# Patient Record
Sex: MALE | Race: WHITE | NOT HISPANIC OR LATINO | Employment: UNEMPLOYED | URBAN - METROPOLITAN AREA
[De-identification: names, ages, dates, MRNs, and addresses within clinical notes are randomized per-mention and may not be internally consistent; named-entity substitution may affect disease eponyms.]

---

## 2017-05-18 ENCOUNTER — COMMUNICATION - HEALTHEAST (OUTPATIENT)
Dept: PEDIATRICS | Facility: CLINIC | Age: 22
End: 2017-05-18

## 2017-05-18 DIAGNOSIS — Z91.09 ENVIRONMENTAL ALLERGIES: ICD-10-CM

## 2017-06-12 ENCOUNTER — OFFICE VISIT - HEALTHEAST (OUTPATIENT)
Dept: PEDIATRICS | Facility: CLINIC | Age: 22
End: 2017-06-12

## 2017-06-12 DIAGNOSIS — Z00.00 ENCOUNTER FOR ANNUAL PHYSICAL EXAM: ICD-10-CM

## 2017-06-12 DIAGNOSIS — L70.0 ACNE VULGARIS: ICD-10-CM

## 2017-06-12 DIAGNOSIS — F84.0 ACTIVE AUTISTIC DISORDER: ICD-10-CM

## 2017-06-12 ASSESSMENT — MIFFLIN-ST. JEOR: SCORE: 1705.56

## 2017-06-13 ENCOUNTER — AMBULATORY - HEALTHEAST (OUTPATIENT)
Dept: LAB | Facility: CLINIC | Age: 22
End: 2017-06-13

## 2017-06-13 DIAGNOSIS — Z00.00 ENCOUNTER FOR ANNUAL PHYSICAL EXAM: ICD-10-CM

## 2017-06-13 LAB
CHOLEST SERPL-MCNC: 150 MG/DL
FASTING STATUS PATIENT QL REPORTED: YES
HDLC SERPL-MCNC: 41 MG/DL
LDLC SERPL CALC-MCNC: 88 MG/DL
TRIGL SERPL-MCNC: 105 MG/DL

## 2017-06-15 ENCOUNTER — COMMUNICATION - HEALTHEAST (OUTPATIENT)
Dept: PEDIATRICS | Facility: CLINIC | Age: 22
End: 2017-06-15

## 2017-06-23 ENCOUNTER — RECORDS - HEALTHEAST (OUTPATIENT)
Dept: ADMINISTRATIVE | Facility: OTHER | Age: 22
End: 2017-06-23

## 2017-06-26 ENCOUNTER — COMMUNICATION - HEALTHEAST (OUTPATIENT)
Dept: PEDIATRICS | Facility: CLINIC | Age: 22
End: 2017-06-26

## 2017-08-23 ENCOUNTER — COMMUNICATION - HEALTHEAST (OUTPATIENT)
Dept: PEDIATRICS | Facility: CLINIC | Age: 22
End: 2017-08-23

## 2017-08-23 DIAGNOSIS — Z91.09 ENVIRONMENTAL ALLERGIES: ICD-10-CM

## 2017-09-19 ENCOUNTER — RECORDS - HEALTHEAST (OUTPATIENT)
Dept: ADMINISTRATIVE | Facility: OTHER | Age: 22
End: 2017-09-19

## 2017-09-21 ENCOUNTER — COMMUNICATION - HEALTHEAST (OUTPATIENT)
Dept: PEDIATRICS | Facility: CLINIC | Age: 22
End: 2017-09-21

## 2018-01-19 ENCOUNTER — COMMUNICATION - HEALTHEAST (OUTPATIENT)
Dept: PEDIATRICS | Facility: CLINIC | Age: 23
End: 2018-01-19

## 2018-05-16 ENCOUNTER — COMMUNICATION - HEALTHEAST (OUTPATIENT)
Dept: PEDIATRICS | Facility: CLINIC | Age: 23
End: 2018-05-16

## 2018-05-16 DIAGNOSIS — Z91.09 ENVIRONMENTAL ALLERGIES: ICD-10-CM

## 2018-06-15 ENCOUNTER — OFFICE VISIT - HEALTHEAST (OUTPATIENT)
Dept: PEDIATRICS | Facility: CLINIC | Age: 23
End: 2018-06-15

## 2018-06-15 DIAGNOSIS — F84.0 ACTIVE AUTISTIC DISORDER: ICD-10-CM

## 2018-06-15 DIAGNOSIS — Z91.09 ENVIRONMENTAL ALLERGIES: ICD-10-CM

## 2018-06-15 DIAGNOSIS — Z00.00 ENCOUNTER FOR ANNUAL HEALTH EXAMINATION: ICD-10-CM

## 2018-06-15 DIAGNOSIS — F80.9 PROBLEMS WITH COMMUNICATION (INCLUDING SPEECH): ICD-10-CM

## 2018-06-15 ASSESSMENT — MIFFLIN-ST. JEOR: SCORE: 1763.74

## 2018-06-18 ENCOUNTER — AMBULATORY - HEALTHEAST (OUTPATIENT)
Dept: LAB | Facility: CLINIC | Age: 23
End: 2018-06-18

## 2018-06-18 DIAGNOSIS — Z00.00 ENCOUNTER FOR ANNUAL HEALTH EXAMINATION: ICD-10-CM

## 2018-06-18 LAB
ALBUMIN SERPL-MCNC: 4.5 G/DL (ref 3.5–5)
ALP SERPL-CCNC: 154 U/L (ref 45–120)
ALT SERPL W P-5'-P-CCNC: 31 U/L (ref 0–45)
AST SERPL W P-5'-P-CCNC: 25 U/L (ref 0–40)
BASOPHILS # BLD AUTO: 0 THOU/UL (ref 0–0.2)
BASOPHILS NFR BLD AUTO: 1 % (ref 0–2)
BILIRUB DIRECT SERPL-MCNC: 0.2 MG/DL
BILIRUB SERPL-MCNC: 0.5 MG/DL (ref 0–1)
CHOLEST SERPL-MCNC: 161 MG/DL
EOSINOPHIL # BLD AUTO: 0.2 THOU/UL (ref 0–0.4)
EOSINOPHIL NFR BLD AUTO: 2 % (ref 0–6)
ERYTHROCYTE [DISTWIDTH] IN BLOOD BY AUTOMATED COUNT: 11.4 % (ref 11–14.5)
FASTING STATUS PATIENT QL REPORTED: YES
HBA1C MFR BLD: 5.2 % (ref 3.5–6.1)
HCT VFR BLD AUTO: 47.9 % (ref 40–54)
HDLC SERPL-MCNC: 44 MG/DL
HGB BLD-MCNC: 16.3 G/DL (ref 14–18)
LDLC SERPL CALC-MCNC: 102 MG/DL
LYMPHOCYTES # BLD AUTO: 1.9 THOU/UL (ref 0.8–4.4)
LYMPHOCYTES NFR BLD AUTO: 27 % (ref 20–40)
MCH RBC QN AUTO: 29.1 PG (ref 27–34)
MCHC RBC AUTO-ENTMCNC: 34 G/DL (ref 32–36)
MCV RBC AUTO: 86 FL (ref 80–100)
MONOCYTES # BLD AUTO: 0.5 THOU/UL (ref 0–0.9)
MONOCYTES NFR BLD AUTO: 7 % (ref 2–10)
NEUTROPHILS # BLD AUTO: 4.5 THOU/UL (ref 2–7.7)
NEUTROPHILS NFR BLD AUTO: 64 % (ref 50–70)
PLATELET # BLD AUTO: 246 THOU/UL (ref 140–440)
PMV BLD AUTO: 7.4 FL (ref 7–10)
PROT SERPL-MCNC: 7.7 G/DL (ref 6–8)
RBC # BLD AUTO: 5.59 MILL/UL (ref 4.4–6.2)
TRIGL SERPL-MCNC: 76 MG/DL
WBC: 7 THOU/UL (ref 4–11)

## 2018-06-19 LAB
25(OH)D3 SERPL-MCNC: 45.4 NG/ML (ref 30–80)
25(OH)D3 SERPL-MCNC: 45.4 NG/ML (ref 30–80)

## 2018-08-13 ENCOUNTER — COMMUNICATION - HEALTHEAST (OUTPATIENT)
Dept: PEDIATRICS | Facility: CLINIC | Age: 23
End: 2018-08-13

## 2018-08-13 DIAGNOSIS — Z91.09 ENVIRONMENTAL ALLERGIES: ICD-10-CM

## 2019-04-15 ENCOUNTER — COMMUNICATION - HEALTHEAST (OUTPATIENT)
Dept: PEDIATRICS | Facility: CLINIC | Age: 24
End: 2019-04-15

## 2019-04-15 DIAGNOSIS — F41.9 ANXIETY: ICD-10-CM

## 2019-04-15 DIAGNOSIS — F84.0 ACTIVE AUTISTIC DISORDER: ICD-10-CM

## 2019-04-17 ENCOUNTER — COMMUNICATION - HEALTHEAST (OUTPATIENT)
Dept: PEDIATRICS | Facility: CLINIC | Age: 24
End: 2019-04-17

## 2019-04-17 DIAGNOSIS — F41.9 ANXIETY: ICD-10-CM

## 2019-04-17 DIAGNOSIS — F84.0 ACTIVE AUTISTIC DISORDER: ICD-10-CM

## 2019-04-30 ENCOUNTER — COMMUNICATION - HEALTHEAST (OUTPATIENT)
Dept: PEDIATRICS | Facility: CLINIC | Age: 24
End: 2019-04-30

## 2019-05-16 ENCOUNTER — OFFICE VISIT - HEALTHEAST (OUTPATIENT)
Dept: PEDIATRICS | Facility: CLINIC | Age: 24
End: 2019-05-16

## 2019-05-16 ENCOUNTER — COMMUNICATION - HEALTHEAST (OUTPATIENT)
Dept: PEDIATRICS | Facility: CLINIC | Age: 24
End: 2019-05-16

## 2019-05-16 DIAGNOSIS — F41.9 ANXIETY: ICD-10-CM

## 2019-05-16 DIAGNOSIS — F80.9 PROBLEMS WITH COMMUNICATION (INCLUDING SPEECH): ICD-10-CM

## 2019-05-16 DIAGNOSIS — F84.0 ACTIVE AUTISTIC DISORDER: ICD-10-CM

## 2019-05-16 ASSESSMENT — MIFFLIN-ST. JEOR: SCORE: 1789.14

## 2019-06-18 ENCOUNTER — COMMUNICATION - HEALTHEAST (OUTPATIENT)
Dept: PEDIATRICS | Facility: CLINIC | Age: 24
End: 2019-06-18

## 2019-06-18 DIAGNOSIS — F84.0 ACTIVE AUTISTIC DISORDER: ICD-10-CM

## 2019-06-18 DIAGNOSIS — F41.9 ANXIETY: ICD-10-CM

## 2019-07-23 ENCOUNTER — COMMUNICATION - HEALTHEAST (OUTPATIENT)
Dept: PEDIATRICS | Facility: CLINIC | Age: 24
End: 2019-07-23

## 2019-07-23 DIAGNOSIS — H57.9 ALLERGIC EYE REACTION: ICD-10-CM

## 2019-08-05 ENCOUNTER — COMMUNICATION - HEALTHEAST (OUTPATIENT)
Dept: PEDIATRICS | Facility: CLINIC | Age: 24
End: 2019-08-05

## 2019-08-05 DIAGNOSIS — F41.9 ANXIETY: ICD-10-CM

## 2019-08-07 ENCOUNTER — COMMUNICATION - HEALTHEAST (OUTPATIENT)
Dept: PEDIATRICS | Facility: CLINIC | Age: 24
End: 2019-08-07

## 2019-08-07 DIAGNOSIS — F84.0 ACTIVE AUTISTIC DISORDER: ICD-10-CM

## 2019-08-15 ENCOUNTER — COMMUNICATION - HEALTHEAST (OUTPATIENT)
Dept: PEDIATRICS | Facility: CLINIC | Age: 24
End: 2019-08-15

## 2019-08-15 DIAGNOSIS — Z91.09 ENVIRONMENTAL ALLERGIES: ICD-10-CM

## 2019-11-30 ENCOUNTER — COMMUNICATION - HEALTHEAST (OUTPATIENT)
Dept: PEDIATRICS | Facility: CLINIC | Age: 24
End: 2019-11-30

## 2019-11-30 DIAGNOSIS — F84.0 ACTIVE AUTISTIC DISORDER: ICD-10-CM

## 2019-12-13 ENCOUNTER — DOCUMENTATION ONLY (OUTPATIENT)
Dept: OTHER | Facility: CLINIC | Age: 24
End: 2019-12-13

## 2019-12-13 ENCOUNTER — AMBULATORY - HEALTHEAST (OUTPATIENT)
Dept: OTHER | Facility: CLINIC | Age: 24
End: 2019-12-13

## 2019-12-29 ENCOUNTER — COMMUNICATION - HEALTHEAST (OUTPATIENT)
Dept: PEDIATRICS | Facility: CLINIC | Age: 24
End: 2019-12-29

## 2019-12-29 DIAGNOSIS — F84.0 ACTIVE AUTISTIC DISORDER: ICD-10-CM

## 2020-01-16 ENCOUNTER — OFFICE VISIT - HEALTHEAST (OUTPATIENT)
Dept: PEDIATRICS | Facility: CLINIC | Age: 25
End: 2020-01-16

## 2020-01-16 DIAGNOSIS — F84.0 ACTIVE AUTISTIC DISORDER: ICD-10-CM

## 2020-01-16 DIAGNOSIS — F80.9 PROBLEMS WITH COMMUNICATION (INCLUDING SPEECH): ICD-10-CM

## 2020-01-16 ASSESSMENT — MIFFLIN-ST. JEOR: SCORE: 1795.38

## 2020-03-27 ENCOUNTER — COMMUNICATION - HEALTHEAST (OUTPATIENT)
Dept: PEDIATRICS | Facility: CLINIC | Age: 25
End: 2020-03-27

## 2020-03-27 DIAGNOSIS — F84.0 ACTIVE AUTISTIC DISORDER: ICD-10-CM

## 2020-05-05 ENCOUNTER — COMMUNICATION - HEALTHEAST (OUTPATIENT)
Dept: PEDIATRICS | Facility: CLINIC | Age: 25
End: 2020-05-05

## 2020-05-05 DIAGNOSIS — F41.9 ANXIETY: ICD-10-CM

## 2020-05-05 RX ORDER — HYDROXYZINE PAMOATE 25 MG/1
CAPSULE ORAL
Qty: 60 CAPSULE | Refills: 3 | Status: SHIPPED | OUTPATIENT
Start: 2020-05-05 | End: 2021-11-05

## 2020-05-06 ENCOUNTER — COMMUNICATION - HEALTHEAST (OUTPATIENT)
Dept: PEDIATRICS | Facility: CLINIC | Age: 25
End: 2020-05-06

## 2020-05-06 DIAGNOSIS — F84.0 ACTIVE AUTISTIC DISORDER: ICD-10-CM

## 2020-08-21 ENCOUNTER — COMMUNICATION - HEALTHEAST (OUTPATIENT)
Dept: PEDIATRICS | Facility: CLINIC | Age: 25
End: 2020-08-21

## 2020-08-21 DIAGNOSIS — Z91.09 ENVIRONMENTAL ALLERGIES: ICD-10-CM

## 2020-08-22 RX ORDER — MONTELUKAST SODIUM 10 MG/1
TABLET ORAL
Qty: 90 TABLET | Refills: 3 | Status: SHIPPED | OUTPATIENT
Start: 2020-08-22 | End: 2021-11-05

## 2020-09-11 ENCOUNTER — COMMUNICATION - HEALTHEAST (OUTPATIENT)
Dept: PEDIATRICS | Facility: CLINIC | Age: 25
End: 2020-09-11

## 2020-09-14 ENCOUNTER — COMMUNICATION - HEALTHEAST (OUTPATIENT)
Dept: PEDIATRICS | Facility: CLINIC | Age: 25
End: 2020-09-14

## 2020-10-03 ENCOUNTER — COMMUNICATION - HEALTHEAST (OUTPATIENT)
Dept: PEDIATRICS | Facility: CLINIC | Age: 25
End: 2020-10-03

## 2020-10-03 DIAGNOSIS — F84.0 ACTIVE AUTISTIC DISORDER: ICD-10-CM

## 2020-10-03 DIAGNOSIS — H57.9 ALLERGIC EYE REACTION: ICD-10-CM

## 2020-10-05 RX ORDER — OLOPATADINE HYDROCHLORIDE 1 MG/ML
SOLUTION/ DROPS OPHTHALMIC
Qty: 5 ML | Refills: 6 | Status: SHIPPED | OUTPATIENT
Start: 2020-10-05 | End: 2021-10-11

## 2020-10-29 ENCOUNTER — OFFICE VISIT - HEALTHEAST (OUTPATIENT)
Dept: FAMILY MEDICINE | Facility: CLINIC | Age: 25
End: 2020-10-29

## 2020-10-29 ENCOUNTER — RECORDS - HEALTHEAST (OUTPATIENT)
Dept: ADMINISTRATIVE | Facility: OTHER | Age: 25
End: 2020-10-29

## 2020-10-29 DIAGNOSIS — F84.0 ACTIVE AUTISTIC DISORDER: ICD-10-CM

## 2020-10-29 DIAGNOSIS — Z76.89 ENCOUNTER TO ESTABLISH CARE: ICD-10-CM

## 2020-10-29 DIAGNOSIS — J30.1 NON-SEASONAL ALLERGIC RHINITIS DUE TO POLLEN: ICD-10-CM

## 2020-10-29 DIAGNOSIS — Z51.81 ENCOUNTER FOR THERAPEUTIC DRUG MONITORING: ICD-10-CM

## 2020-10-29 DIAGNOSIS — Z00.00 ROUTINE GENERAL MEDICAL EXAMINATION AT A HEALTH CARE FACILITY: ICD-10-CM

## 2020-10-29 DIAGNOSIS — F80.9 PROBLEMS WITH COMMUNICATION (INCLUDING SPEECH): ICD-10-CM

## 2020-10-29 LAB
ALBUMIN SERPL-MCNC: 4.7 G/DL (ref 3.5–5)
ALP SERPL-CCNC: 162 U/L (ref 45–120)
ALT SERPL W P-5'-P-CCNC: 30 U/L (ref 0–45)
ANION GAP SERPL CALCULATED.3IONS-SCNC: 11 MMOL/L (ref 5–18)
AST SERPL W P-5'-P-CCNC: 19 U/L (ref 0–40)
BILIRUB SERPL-MCNC: 0.4 MG/DL (ref 0–1)
BUN SERPL-MCNC: 17 MG/DL (ref 8–22)
CALCIUM SERPL-MCNC: 10.2 MG/DL (ref 8.5–10.5)
CHLORIDE BLD-SCNC: 104 MMOL/L (ref 98–107)
CHOLEST SERPL-MCNC: 138 MG/DL
CO2 SERPL-SCNC: 26 MMOL/L (ref 22–31)
CREAT SERPL-MCNC: 0.83 MG/DL (ref 0.7–1.3)
ERYTHROCYTE [DISTWIDTH] IN BLOOD BY AUTOMATED COUNT: 11.5 % (ref 11–14.5)
FASTING STATUS PATIENT QL REPORTED: ABNORMAL
GFR SERPL CREATININE-BSD FRML MDRD: >60 ML/MIN/1.73M2
GLUCOSE BLD-MCNC: 86 MG/DL (ref 70–125)
HBA1C MFR BLD: 5 %
HCT VFR BLD AUTO: 46.9 % (ref 40–54)
HDLC SERPL-MCNC: 42 MG/DL
HGB BLD-MCNC: 15.9 G/DL (ref 14–18)
LDLC SERPL CALC-MCNC: 56 MG/DL
MCH RBC QN AUTO: 29.4 PG (ref 27–34)
MCHC RBC AUTO-ENTMCNC: 34 G/DL (ref 32–36)
MCV RBC AUTO: 87 FL (ref 80–100)
PLATELET # BLD AUTO: 243 THOU/UL (ref 140–440)
PMV BLD AUTO: 7.4 FL (ref 7–10)
POTASSIUM BLD-SCNC: 4.1 MMOL/L (ref 3.5–5)
PROT SERPL-MCNC: 7.6 G/DL (ref 6–8)
RBC # BLD AUTO: 5.42 MILL/UL (ref 4.4–6.2)
SODIUM SERPL-SCNC: 141 MMOL/L (ref 136–145)
TRIGL SERPL-MCNC: 201 MG/DL
WBC: 7.3 THOU/UL (ref 4–11)

## 2020-10-29 RX ORDER — CHLORAL HYDRATE 500 MG
2 CAPSULE ORAL DAILY
Status: SHIPPED | COMMUNITY
Start: 2020-10-29

## 2020-10-29 ASSESSMENT — MIFFLIN-ST. JEOR: SCORE: 1767.94

## 2020-10-30 LAB
25(OH)D3 SERPL-MCNC: 36.5 NG/ML (ref 30–80)
25(OH)D3 SERPL-MCNC: 36.5 NG/ML (ref 30–80)

## 2020-12-01 ENCOUNTER — COMMUNICATION - HEALTHEAST (OUTPATIENT)
Dept: FAMILY MEDICINE | Facility: CLINIC | Age: 25
End: 2020-12-01

## 2020-12-01 DIAGNOSIS — F84.0 ACTIVE AUTISTIC DISORDER: ICD-10-CM

## 2020-12-01 RX ORDER — RISPERIDONE 1 MG/1
TABLET ORAL
Qty: 110 TABLET | Refills: 1 | Status: SHIPPED | OUTPATIENT
Start: 2020-12-01 | End: 2021-07-19

## 2021-05-28 NOTE — PATIENT INSTRUCTIONS - HE
Recommend starting an increased dose of Fluoxetine from 10 mg to 15 mg and then to 20mg Start the new dose for a couple of week before the [place] trip.    Recommend using the Spotlight alarm clock to help make sure the patient stays in his room.

## 2021-05-28 NOTE — PROGRESS NOTES
ASSESSMENT:  1. Anxiety    2. Autistic Disorder    3. Learning Disability - Expressive Language    Mike has had improved symptoms of decreased anxiety- he has less rigidity of him needing to do certain chores at home or at his respite home, but he still has escalation of behaviors due to changes in typical routine that is making it hard for respite family to consistently provide care for him when they have other clients present.  At home, his symptoms are disruptive, but less so, as he is the only child living at home at this point, and parents are able to redirect him well. Despite this, mother notes she feels that there room for improvement in his symptoms presently.    PLAN:  AT this time will start with gradual increase to get Mike to fluoxetine 20 mg once daily. First, start with 15 mg for several days, then increase to 20 mg.  This will allow him to be at goal dosing a few weeks prior to parents having extended travel in [place] and Mike being in respite care.    Patient Instructions   Recommend starting an increased dose of Fluoxetine from 10 mg to 15 mg and then to 20mg Start the new dose for a couple of week before the [place] trip.    Recommend using the Spotlight alarm clock to help make sure the patient stays in his room.      No orders of the defined types were placed in this encounter.    There are no discontinued medications.    Return in about 3 months (around 8/16/2019) for Annual physical.    CHIEF COMPLAINT:  Chief Complaint   Patient presents with     Follow-up     Medication       HISTORY OF PRESENT ILLNESS:  Krishna is a 23 y.o. male presenting to the clinic today with his mom for evaluation of anxiety medication management. The mom reports when the patient cannot empty the , the dad could not come home last night, when the mom drives the dad's car, and when pt loses things the patient becomes upset.  He has been increasing fixated on these types of events.  Pt's mom states the  "patient would runaway when he becomes upset. The mom tries to calm down the patient and remind him other events they have planned that bring him clarisse.  Mike's symptoms were significantly disuruptive to his daily life and mom reached out to me via SYLOBt several weeks ago.  We initiated low dose fluoxetine, first 5 mg daily for several days then increased to 10 mg daily. The mom noticed a positive shift while in the medication and there is less \"worry talk.\" than previously. He still has some rigidity in thinking, which is disruptive at his respite care home.  Mike wants to be the only client to do chores. This makes it difficult for other clients, or Mike has escalation of \"worry talk\" and frustration behaviors when he does not get to do all the chores he wants to do.  These are examples how Mike's anxious symptoms are demonstrated.    The parents will go to [facility] on 6/4/2019 for 19 days as part of a medical mission trip.    . Pt has a job at [employer] and will take a break during the summer.  He will return to work in the fall and they are happy to have him there.    REVIEW OF SYSTEMS:   Review of Systems   Psychiatric/Behavioral: Positive for behavioral problems (becomes upset and runaway).        Positive for depression (improved).   All other systems reviewed and are negative.    PFSH:    Past Medical History:   Diagnosis Date     Autistic Disorder     Created by [employer] [facility] Annotation: Aug  3 2010  5:11PM - Charlotte Sepulveda: Dx at age 3.   Has done chelation therapy, DAN program.  Multiple behavior therapy programs.      Learning Disability - Expressive Language     Created by [employer] [facility] Annotation: Aug  3 2010  5:11PM - Nannette Eduardo: Uses Ipad to help  with communication.      Mental retardation      Paronychia     Created by [employer]        Family History   Problem Relation Age of Onset     Hypertension Mother      No Medical Problems Father      OCD Sister      Migraines " "Sister        Past Surgical History:   Procedure Laterality Date     Dental Repair      Age 6       Social History     Social History Narrative    Mom, dad, sister       VITALS:  Vitals:    05/16/19 1318   BP: 110/62   Patient Site: Left Arm   Patient Position: Sitting   Cuff Size: Adult Regular   Pulse: 82   Temp: 97.6  F (36.4  C)   TempSrc: Oral   Weight: 171 lb 4.8 oz (77.7 kg)   Height: 5' 11\" (1.803 m)     Wt Readings from Last 3 Encounters:   05/16/19 171 lb 4.8 oz (77.7 kg)   06/15/18 165 lb 11.2 oz (75.2 kg)   06/12/17 155 lb 8 oz (70.5 kg)     Body mass index is 23.89 kg/m .    PHYSICAL EXAM:  Alert, no acute distress.  Mood and affect are upbeat  There is occasional  eye contact with the examiner.  Patient appears relaxed and well groomed.  He repeats phrases spoken to him.  He demonstrates some insight and follows conversation intermittently Speech is unpressured.  Neck is without thyromegaly.  Cardiac exam regular rate and rhythm, normal S1 and S2.      ADDITIONAL HISTORY SUMMARIZED (2): None.  DECISION TO OBTAIN EXTRA INFORMATION (1): None.   RADIOLOGY TESTS (1): None.  LABS (1): None.  MEDICINE TESTS (1): None.  INDEPENDENT REVIEW (2 each): None.         The visit lasted a total of 26 minutes that I spent face to face with the patient. Over 50% of the time was spent counseling and educating the patient about depression medication management.    By signing my name below, I, Rivka Major, attest that this documentation has been prepared under the direction and in the presence of Dr. Charlotte Sepulveda.  Electronic Signature: Melissa Barcenas. 05/16/2019 1:37 PM.    I, Dr. Charlotte Sepulveda , personally performed the services described in this documentation. All medical record entries made by the scribe were at my direction and in my presence. I have reviewed the chart and discharge instructions (if applicable) and agree that the record reflects my personal performance and is accurate and " complete.    MEDICATIONS:  Current Outpatient Medications   Medication Sig Dispense Refill     cholecalciferol, vitamin D3, (VITAMIN D3 ORAL) Take by mouth.       FLUoxetine (PROZAC) 10 MG tablet TAKE ONE-HALF TABLET BY MOUTH ONCE DAILY FOR ONE WEEK, THEN INCREASE TO 1 FULL TABLET ONCE DAILY 90 tablet 0     fluticasone propionate (FLONASE) 50 mcg/actuation nasal spray Apply 1 spray into each nostril daily.       montelukast (SINGULAIR) 10 mg tablet TAKE 1 TABLET(10 MG) BY MOUTH BEDTIME 90 tablet 3     olopatadine (PATANOL) 0.1 % ophthalmic solution Administer 1 drop to both eyes 2 (two) times a day. 5 mL 6     risperiDONE (RISPERDAL) 1 MG tablet Take 0.5 to 1 mg by mouth in morning and 1 mg in pm. 180 tablet 3     UNABLE TO FIND Med Name:INDIRA Vitamin supplement       UNABLE TO FIND Med Name: Dimethylglycine with folic acid       montelukast (SINGULAIR) 10 mg tablet TAKE 1 TABLET(10 MG) BY MOUTH BEDTIME 90 tablet 2     No current facility-administered medications for this visit.        Total data points: 1

## 2021-05-28 NOTE — TELEPHONE ENCOUNTER
RN cannot approve Refill Request    RN can NOT refill this medication med is not covered by policy/route to provider. Last office visit: 5/16/2019 Charlotte Sepulveda MD Last Physical: 6/15/2018 Last MTM visit: Visit date not found Last visit same specialty: 5/16/2019 Charlotte Sepulveda MD.  Next visit within 3 mo: Visit date not found  Next physical within 3 mo: Visit date not found      Marilynnbalta Lei, Care Connection Triage/Med Refill 5/16/2019    Requested Prescriptions   Pending Prescriptions Disp Refills     FLUoxetine (PROZAC) 20 MG tablet [Pharmacy Med Name: FLUOXETINE 20MG TABLETS] 90 tablet 6     Sig: TAKE 1 TABLET(20 MG) BY MOUTH DAILY       SSRI Refill Protocol  Failed - 5/16/2019  2:33 PM        Failed - Age 21 and younger route to prescribing provider     Last office visit with prescriber/PCP: 5/16/2019 Charlotte Sepulveda MD OR same dept: 5/16/2019 Charlotte Sepulveda MD OR same specialty: 5/16/2019 Charlotte Sepulveda MD  Last physical: 6/15/2018 Last MTM visit: Visit date not found   Next visit within 3 mo: Visit date not found  Next physical within 3 mo: Visit date not found  Prescriber OR PCP: Charlotte Sepulveda MD  Last diagnosis associated with med order: 1. Anxiety  - FLUoxetine (PROZAC) 20 MG tablet [Pharmacy Med Name: FLUOXETINE 20MG TABLETS]; TAKE 1 TABLET(20 MG) BY MOUTH DAILY  Dispense: 90 tablet; Refill: 6    If protocol passes may refill for 12 months if within 3 months of last provider visit (or a total of 15 months).             Passed - PCP or prescribing provider visit in last year     Last office visit with prescriber/PCP: 5/16/2019 Charlotte Sepulveda MD OR same dept: 5/16/2019 Charlotte Sepulveda MD OR same specialty: 5/16/2019 Charlotte Sepulveda MD  Last physical: 6/15/2018 Last MTM visit: Visit date not found   Next visit within 3 mo: Visit date not found  Next physical within 3 mo: Visit date not found  Prescriber OR PCP: Charlotte  Roosevelt Sepulveda MD  Last diagnosis associated with med order: 1. Anxiety  - FLUoxetine (PROZAC) 20 MG tablet [Pharmacy Med Name: FLUOXETINE 20MG TABLETS]; TAKE 1 TABLET(20 MG) BY MOUTH DAILY  Dispense: 90 tablet; Refill: 6    If protocol passes may refill for 12 months if within 3 months of last provider visit (or a total of 15 months).

## 2021-05-29 ENCOUNTER — HEALTH MAINTENANCE LETTER (OUTPATIENT)
Age: 26
End: 2021-05-29

## 2021-05-29 ENCOUNTER — RECORDS - HEALTHEAST (OUTPATIENT)
Dept: ADMINISTRATIVE | Facility: CLINIC | Age: 26
End: 2021-05-29

## 2021-05-30 NOTE — TELEPHONE ENCOUNTER
Refill Approved    Rx renewed per Medication Renewal Policy. Medication was last renewed on 6/15/2018 with 6 refills.  Last office visit: 5/16/2019 with Dr CARMEN Sepulveda.    Marilynn Lei, Care Connection Triage/Med Refill 7/24/2019     Requested Prescriptions   Pending Prescriptions Disp Refills     olopatadine (PATANOL) 0.1 % ophthalmic solution [Pharmacy Med Name: OLOPATADINE 0.1% OPTH SOLN 5ML] 5 mL 0     Sig: INSTILL 1 DROP IN BOTH EYES TWICE DAILY       Opthalmic Allergy Drops Refill Protocol Passed - 7/23/2019 12:04 PM        Passed - Patient has had office visit/physical in last 12 months     Last office visit with prescriber/PCP: 5/16/2019 Charlotte Sepulveda MD OR same dept: 5/16/2019 Charlotte Sepulveda MD OR same specialty: 5/16/2019 Charlotte Sepulveda MD  Last physical: 6/15/2018 Last MTM visit: Visit date not found   Next visit within 3 mo: Visit date not found  Next physical within 3 mo: Visit date not found  Prescriber OR PCP: Charlotte Sepulveda MD  Last diagnosis associated with med order: There are no diagnoses linked to this encounter.  If protocol passes may refill for 12 months if within 3 months of last provider visit (or a total of 15 months).

## 2021-05-31 VITALS — BODY MASS INDEX: 22.26 KG/M2 | HEIGHT: 70 IN | WEIGHT: 155.5 LBS

## 2021-05-31 NOTE — TELEPHONE ENCOUNTER
RN cannot approve Refill Request    RN can NOT refill this medication med is not covered by policy/route to provider. Last office visit: 5/16/2019 Charlotte Sepulveda MD Last Physical: 6/15/2018 Last MTM visit: Visit date not found Last visit same specialty: 5/16/2019 Charlotte Sepulveda MD.  Next visit within 3 mo: Visit date not found  Next physical within 3 mo: Visit date not found      Lucy Reyes, Care Connection Triage/Med Refill 8/15/2019    Requested Prescriptions   Pending Prescriptions Disp Refills     montelukast (SINGULAIR) 10 mg tablet [Pharmacy Med Name: MONTELUKAST 10MG TABLETS] 90 tablet 0     Sig: TAKE 1 TABLET(10 MG) BY MOUTH BEDTIME       There is no refill protocol information for this order

## 2021-05-31 NOTE — TELEPHONE ENCOUNTER
RN cannot approve Refill Request    RN can NOT refill this medication med is not covered by policy/route to provider. Last office visit: 5/16/2019 Charlotte Sepulveda MD Last Physical: 6/15/2018 Last MTM visit: Visit date not found Last visit same specialty: 5/16/2019 Charlotte Sepulveda MD.  Next visit within 3 mo: Visit date not found  Next physical within 3 mo: Visit date not found      Madelyn Vazquez, Care Connection Triage/Med Refill 8/7/2019    Requested Prescriptions   Pending Prescriptions Disp Refills     risperiDONE (RISPERDAL) 1 MG tablet [Pharmacy Med Name: RISPERIDONE 1MG TABLETS] 180 tablet 0     Sig: TAKE 1/2- 1 TABLET BY MOUTH IN THE MORNING AND 1 TABLET IN THE EVENINGQ       There is no refill protocol information for this order

## 2021-05-31 NOTE — TELEPHONE ENCOUNTER
Fax received from [facility] pharmacy requesting Prior Authorization    Medication Name:   FLUoxetine (PROZAC) 20 MG tablet 90 tablet 1 5/17/2019     Sig: TAKE 1 TABLET(20 MG) BY MOUTH DAILY    Sent to pharmacy as: FLUoxetine (PROZAC) 20 MG tablet    Notes to Pharmacy: **Patient requests 90 days supply**    E-Prescribing Status: Receipt confirmed by pharmacy (5/17/2019  8:28 AM CDT)          Insurance Plan: [email or web address]   ELGIN:    Patient ID Number: [id number]    Please start PA process

## 2021-05-31 NOTE — TELEPHONE ENCOUNTER
Called pharmacy to clarify if they actually need a PA for 90 days supply, this should be going through insurance without an issue.     Per tech she stated they don't have a Rx for a 90 days supply.  Told her there is on Epic that looks like it was sent but she still stated she does not have a Rx for a 90 days supply.     She was unable to tell me then if a PA is needed because they never were able to run it through insurance, this was a refill request.      Please send Rx for 90 days supply again to [id number] - [place], [place] - [facility] [address] AT [facility] [address] [facility]

## 2021-06-01 VITALS — WEIGHT: 165.7 LBS | HEIGHT: 71 IN | BODY MASS INDEX: 23.2 KG/M2

## 2021-06-03 VITALS — BODY MASS INDEX: 23.98 KG/M2 | WEIGHT: 171.3 LBS | HEIGHT: 71 IN

## 2021-06-03 NOTE — TELEPHONE ENCOUNTER
This was refilled for 1 moth. Krishna is overdue for an appointment. I will leave this for Dr. Sepulveda later this week as I am not sure what she has discussed with them for transition of care.

## 2021-06-03 NOTE — TELEPHONE ENCOUNTER
RN cannot approve Refill Request    RN can NOT refill this medication med is not covered by policy/route to provider. Last office visit: 5/16/2019 Charlotte Sepulveda MD Last Physical: 6/15/2018 Last MTM visit: Visit date not found Last visit same specialty: 5/16/2019 Charlotte Sepulveda MD.  Next visit within 3 mo: Visit date not found  Next physical within 3 mo: Visit date not found      Marilynn Lei, Care [facility] Triage/Med Refill 11/30/2019    Requested Prescriptions   Pending Prescriptions Disp Refills     risperiDONE (RISPERDAL) 1 MG tablet [Pharmacy Med Name: RISPERIDONE 1MG TABLETS] 60 tablet 0     Sig: TAKE 1-2 TABLETS BY MOUTH ONCE DAILY AT BEDTIME       There is no refill protocol information for this order

## 2021-06-04 VITALS
HEIGHT: 70 IN | BODY MASS INDEX: 25.09 KG/M2 | WEIGHT: 175.3 LBS | DIASTOLIC BLOOD PRESSURE: 62 MMHG | SYSTOLIC BLOOD PRESSURE: 106 MMHG

## 2021-06-04 NOTE — TELEPHONE ENCOUNTER
Please ask parent to have Mike come in for a medication check appt.  His last visit was in May.  For stable patients without medication changes I have them come in every 6 months for a medication check to make sure we are still on appropriate dosing.  Please have Mike have an appt in January for medication check with me. Charlotte Sepulveda MD 12/5/2019 5:05 PM

## 2021-06-04 NOTE — TELEPHONE ENCOUNTER
RN cannot approve Refill Request    RN can NOT refill this medication med is not covered by policy/route to provider. Last office visit: Visit date not found Last Physical: Visit date not found Last MTM visit: Visit date not found Last visit same specialty: 5/16/2019 Charlotte Sepulveda MD.  Next visit within 3 mo: Visit date not found  Next physical within 3 mo: Visit date not found      Marilynn Lei, Care [facility] Triage/Med Refill 12/29/2019    Requested Prescriptions   Pending Prescriptions Disp Refills     risperiDONE (RISPERDAL) 1 MG tablet [Pharmacy Med Name: RISPERIDONE 1MG TABLETS] 60 tablet 0     Sig: TAKE 1 TO 2 TABLETS BY MOUTH EVERY DAY AT BEDTIME       There is no refill protocol information for this order

## 2021-06-05 VITALS
BODY MASS INDEX: 23.45 KG/M2 | HEART RATE: 96 BPM | WEIGHT: 167.5 LBS | DIASTOLIC BLOOD PRESSURE: 68 MMHG | SYSTOLIC BLOOD PRESSURE: 110 MMHG | HEIGHT: 71 IN

## 2021-06-05 NOTE — PATIENT INSTRUCTIONS - HE
Bing Walker at [facility]: [email or web address]  Phone: [phone]  Address: [facility] [address] Physicians that work with Medicinal Marijuana   Dr. Hayward   Address: [address] [place] [address]. [place] [address]  Phone: [phone]    Dr. Wild   Address: [address] [place] [address]. [place] [address]  Phone: [phone]

## 2021-06-05 NOTE — PROGRESS NOTES
ASSESSMENT:  1. Autistic Disorder    2. Learning Disability - Expressive Language    Mike has symptoms of rigid thinking and anxiety with his autism.  He was overactivated by fluoxetine when it was trialed last year.  At his annual exam we had discussed positive results for many patients with autism with initiating medicinal cannibis.  Mother is interested in pursuing at this time rather than trial another SSRI.     PLAN: Information given to mother of providers who have been certified in prescribing medicinal cannibis.  Mother will initially look at [facility] with Bing Walker DNP.  She will call if she has any further questions.   Also discussed for Mike to have 1 last annual physical with me and then transition to adult medicine care.     Patient Instructions   Bing Walker at [facility]: [email or web address]  Phone: [phone]  Address: [address] [place] [address] Physicians that work with Medicinal Marijuana   Dr. Hayward   Address: [address] [place] [address]. [place] [address]  Phone: [phone]    Dr. Wild   Address: [address] [place] [address]. [place] [address]  Phone: [phone]          No orders of the defined types were placed in this encounter.    Medications Discontinued During This Encounter   Medication Reason     risperiDONE (RISPERDAL) 1 MG tablet Duplicate order     montelukast (SINGULAIR) 10 mg tablet Duplicate order       No follow-ups on file.    CHIEF COMPLAINT:  Chief Complaint   Patient presents with     Medication Management       HISTORY OF PRESENT ILLNESS:  Krishna is a 24 y.o. male presenting to the clinic today for a medication check. He is accompanied by his mom.     He stopped taking the fluoxetine 8 months ago because he was unable to sleep, he was too focused on certain things, and he was not tolerating the increased dose from 5 to 10 mg. Mom is unsure about starting the fluoxetine again, she would be  interested in trying CBD/THC.     He still uses the risperidone twice a day, 1 mg in the morning and night. He picks his ears and pulls his hair when he is stressed out. He gets stressed when a new event is coming up, like respite care or group outings with [facility]. He also is chronically worrying about things at home when he is not at home. Mom gets very stressed out from constantly having to reassure him.     Health maintenance: He wakes up at 7 am and goes to bed at 9:30 pm. He eats oatmeal for breakfast. He likes cheese sticks with meat for lunch. He makes tacos for dinner, he helps make it with his parents. He likes to drink root beer and water. He carries a water bottle around with him to remind him to drink it everyday.     REVIEW OF SYSTEMS:   All other systems are negative.    PFSH:  His sister is living at home and working as a nurse at [employer]. His sister suffers from OCD and anxiety. She has been on fluoxetine in the past    They have begun looking for a place for Mike to live in the future. He will not live in a group home. They plan to have him live with roommates and have staff come to his house. This is a 2-3 year plan in the making. [facility] still goes to [facility] 4 days a month. He goes for 4 days in a row, he will be going there next week.     He goes to [facility] 3 days a week. They went to the [employer] today. They do outings for him with a group. Mom thinks it is the best option out there for him, but she wishes there was more of a structure. Mike likes a routine and a structure.       Past Medical History:   Diagnosis Date     Autistic Disorder     Created by [employer] [facility] Annotation: Aug  3 2010  5:11PM - Charlotte Sepulveda: Dx at age 3.   Has done chelation therapy, DAN program.  Multiple behavior therapy programs.      Learning Disability - Expressive Language     Created by [employer] [facility] Annotation: Aug  3 2010  5:11PM -  "Nannette Eduardo: Uses Ipad to help  with communication.      Mental retardation      Paronychia     Created by [employer]        Family History   Problem Relation Age of Onset     Hypertension Mother      No Medical Problems Father      OCD Sister      Migraines Sister        Past Surgical History:   Procedure Laterality Date     Dental Repair      Age 6         VITALS:  Vitals:    01/16/20 1445   BP: 106/62   Patient Site: Left Arm   Patient Position: Sitting   Cuff Size: Adult Regular   Weight: 175 lb 4.8 oz (79.5 kg)   Height: 5' 10.25\" (1.784 m)     Wt Readings from Last 3 Encounters:   01/16/20 175 lb 4.8 oz (79.5 kg)   05/16/19 171 lb 4.8 oz (77.7 kg)   06/15/18 165 lb 11.2 oz (75.2 kg)     Body mass index is 24.97 kg/m .    PHYSICAL EXAM:  Alert, no acute distress.  Mood is euthymic, affect is congruent.  There is limited eye contact with the examiner.  Patient appears relaxed and well groomed.  No psychomotor agitation or retardation. His speech patterns are repetative, and answers questions appropriately.   Lungs: Clear to auscultation bilaterally. No wheezes, rhonchi, or rales. No prolongation of expiratory phase.   Cardiac: Regular rate and rhythm, no murmur audible.    ADDITIONAL HISTORY SUMMARIZED (2): None.  DECISION TO OBTAIN EXTRA INFORMATION (1): None.   RADIOLOGY TESTS (1): None.  LABS (1): None.  MEDICINE TESTS (1): None.  INDEPENDENT REVIEW (2 each): None.     The visit lasted a total of 25 minutes that I spent face to face with the patient. Over 50% of the time was spent counseling and educating the patient about medication check.    I, Vanessa Bose, am scribing for and in the presence of, Dr. Sepulveda.    I, Dr. Ewelina Sepulveda, personally performed the services described in this documentation, as scribed by Vanessa Bose in my presence, and it is both accurate and complete.    MEDICATIONS:  Current Outpatient Medications   Medication Sig Dispense Refill     cholecalciferol, vitamin D3, (VITAMIN D3 " ORAL) Take by mouth.       fluticasone propionate (FLONASE) 50 mcg/actuation nasal spray Apply 1 spray into each nostril daily.       montelukast (SINGULAIR) 10 mg tablet TAKE 1 TABLET(10 MG) BY MOUTH BEDTIME 90 tablet 3     olopatadine (PATANOL) 0.1 % ophthalmic solution Administer 1 drop to both eyes 2 (two) times a day. 5 mL 6     risperiDONE (RISPERDAL) 1 MG tablet TAKE 1/2- 1 TABLET BY MOUTH IN THE MORNING AND 1 TABLET IN THE EVENINGQ 180 tablet 0     UNABLE TO FIND Med Name:INDIRA Vitamin supplement       UNABLE TO FIND Med Name: Dimethylglycine with folic acid       FLUoxetine (PROZAC) 10 MG tablet TAKE ONE-HALF TABLET BY MOUTH ONCE DAILY FOR ONE WEEK, THEN INCREASE TO 1 FULL TABLET ONCE DAILY 90 tablet 0     FLUoxetine (PROZAC) 20 MG tablet Take 1 tablet (20 mg total) by mouth daily. 90 tablet 1     No current facility-administered medications for this visit.        Total data points:0

## 2021-06-07 NOTE — TELEPHONE ENCOUNTER
RN cannot approve Refill Request    RN can NOT refill this medication med is not covered by policy/route to provider. Last office visit: Visit date not found Last Physical: Visit date not found Last MTM visit: Visit date not found Last visit same specialty: 1/16/2020 Charlotte Sepulveda MD.  Next visit within 3 mo: Visit date not found  Next physical within 3 mo: Visit date not found      Tali Corbin, [facility] Triage/Med Refill 3/27/2020    Requested Prescriptions   Pending Prescriptions Disp Refills     risperiDONE (RISPERDAL) 1 MG tablet [Pharmacy Med Name: RISPERIDONE 1MG TABLETS] 180 tablet 0     Sig: TAKE 1/2- 1 TABLET BY MOUTH IN THE MORNING AND 1 TABLET BY MOUTH INTHE EVENING       There is no refill protocol information for this order

## 2021-06-08 NOTE — TELEPHONE ENCOUNTER
RN cannot approve Refill Request    RN can NOT refill this medication med is not covered by policy/route to provider. Last office visit: Visit date not found Last Physical: Visit date not found Last MTM visit: Visit date not found Last visit same specialty: 1/16/2020 Charlotte Sepulveda MD.  Next visit within 3 mo: Visit date not found  Next physical within 3 mo: Visit date not found      Marilynn Lei, [facility] Triage/Med Refill 5/6/2020    Requested Prescriptions   Pending Prescriptions Disp Refills     risperiDONE (RISPERDAL) 1 MG tablet 180 tablet 0     Sig: TAKE 1/2- 1 TABLET BY MOUTH IN THE MORNING AND 1 TABLET BY MOUTH INTHE EVENING       There is no refill protocol information for this order

## 2021-06-10 NOTE — TELEPHONE ENCOUNTER
RN cannot approve Refill Request    RN can NOT refill this medication Protocol failed and NO refill given. Last office visit: 1/16/2020 Charlotte Sepulveda MD Last Physical: 6/15/2018 Last MTM visit: Visit date not found Last visit same specialty: 1/16/2020 Charlotte Sepulveda MD.  Next visit within 3 mo: Visit date not found  Next physical within 3 mo: Visit date not found      Tali Corbin, [facility] Triage/Med Refill 8/22/2020    Requested Prescriptions   Pending Prescriptions Disp Refills     montelukast (SINGULAIR) 10 mg tablet [Pharmacy Med Name: MONTELUKAST 10MG TABLETS] 90 tablet 3     Sig: TAKE 1 TABLET(10 MG) BY MOUTH BEDTIME       There is no refill protocol information for this order

## 2021-06-11 NOTE — TELEPHONE ENCOUNTER
Left message to call back for: [facility]   Information to relay to patient:  in regards to the physical form we received for Krishna. He is overdue for a px. He has not had one since 6/2018.  said that she would be happy to see them again or that she has previously recommended establishing care with Dr. Mcdonnell at the [facility] she is leaving the decision up to the family.     Please help schedule pt for a px with whomever they decide when they call back.     Deepika Beckwith, FARAZA

## 2021-06-11 NOTE — PROGRESS NOTES
[facility] Well Child Check    ASSESSMENT & PLAN  Krishna Morel is a 21 y.o. who has normal growth and abnormal development:  Autism Spectrum Disorder, Learning Disability- Expressive Language.    Diagnoses and all orders for this visit:    Encounter for annual physical exam  -     Lipid Profile; Future  -     Thyroid Stimulating Hormone (TSH); Future  -     Glucose; Future  -     Hepatic Profile; Future  -     HM1(CBC and Differential); Future    Autistic Disorder    Acne vulgaris    Other orders  -     risperiDONE (RISPERDAL) 1 MG tablet; Take 1-2 tablets (1-2 mg total) by mouth at bedtime.  Dispense: 60 tablet; Refill: 11  -     dapsone 5 % topical gel; Apply to affected area once daily for 3 months  Dispense: 30 g; Refill: 3  -     tretinoin (RETIN-A) 0.025 % cream; Apply topically at bedtime.  Dispense: 45 g; Refill: 3     I discuss that I will continue to refill risperidone for Mike.  His psychiatrist is no longer at previous clinic and will be at the [facility].  We discussed if he returns to having increased emotional difficulties with medicaiton management will refer back to psychiatry  I have discussed that I am happy to see Mike up until approx 24-25 years of age, and then will help transition to med/peds likely.  Dapzone and tretinoin given for help with acne vulgaris.  He does get cystic acne but mother has been expereienceing success with topical creams.   Advised on updating immunizaionts particularly Tdap and Menactra.    Return to clinic in 1 year for a Well Child Check or sooner as needed    IMMUNIZATIONS/LABS  No immunizations due today.    REFERRALS  Dental:  Recommend routine dental care as appropriate., The patient has already established care with a dentist.  Other:  No referrals were made at this time.    ANTICIPATORY GUIDANCE  I have reviewed age appropriate anticipatory guidance.  Social:  Social Activities  Parenting:  [place]/Dependence and Chores  Nutrition:  Age Specific Nutritional  Needs  Play and Communication:  Appropriate Use of TV and Hobbies  Health:  Activity (>45 min/day), Sleep and Dental Care  Safety:  Seat Belts, Swimming Safety and Bike/Motorcycle Helmets    HEALTH HISTORY  Do you have any concerns that you'd like to discuss today?: discuss acne meds- mom tried sisters with him and they seemed to work    He has severe acne on his face and back. He tends to develop large, deep cysts. His sister uses Aczone for her acne which works well. Mom tried using Aczone on his skin which has significantly improved his skin.    Roomed by: aleisha    Accompanied by Mother    Refills needed? No    Do you have any forms that need to be filled out? No      Do you have any significant health concerns in your family history?: No   Family History   Problem Relation Age of Onset     Hypertension Mother      No Medical Problems Father      OCD Sister      Migraines Sister       Since your last visit, have there been any major changes in your family, such as a move, job change, separation, divorce, or death in the family?: No    Home  Who lives in your home?:  Mom, dad, sister, Shabana, and dog  Social History     Social History Narrative    Mom, dad, sister     Do you have any trouble with sleep?:  No, he sleeps soundly through the night without waking. He gets sufficient restful sleep each night. He is well-rested and has a good energy level during the day.    Education  What school does your child attend?:  [employer]  What grade is your child in?:  n/a  How does the patient perform in school (grades, behavior, attention, homework?: n/a. He has one more year in the [employer]. 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 He will begin a daytime program after he finishes the [employer] next year. His parents have also been applying for financial assistance for him. They plan to transition him to a group home setting in the future.  (around 24-25 years of age)    Eating He has a good appetite. He likes chicken, asparagus, apples, lima beans, peas, and corn. He does not like salad. He likes to help his mother cook. He eats a healthy, balanced diet with a variety of fruits, vegetables, and proteins. He drinks water daily but does not drink milk. He is well-nourished and maintaining a healthy body weight.  Does patient eat regular meals including fruits and vegetables?:  yes  What is the patient drinking (cow's milk, water, soda, juice, sports drinks, energy drinks, etc)?: water and not a milk drinker  Does patient have concerns about body or appearance?:  No    Activities  Does the patient have friends?:  yes  Does the patient get at least one hour of physical activity per day?:  yes  Does the patient have less than 2 hours of screen time per day (aside from homework)?: He gets about 2 hours per day. He has his own iPad.  What does your child do for exercise?:  Special olympics- softball, bowling, walking, and swimming  Does the patient have interest/participate in community activities/volunteers/school sports?:  no    MENTAL HEALTH SCREENING  No Data Recorded    VISION/HEARING  Vision: Patient is already followed by a vision specialist  Hearing:  Completed. See Results    No exam data present    TB Risk Assessment:  The patient and/or parent/guardian answer positive to:  patient and/or parent/guardian answer 'no' to all screening TB questions    Dental  Is your child being seen by a dentist?  Yes    Patient Active Problem List   Diagnosis     Paronychia     Autistic Disorder     Learning Disability - Expressive Language     Drugs  Does the patient use tobacco/alcohol/drugs?:  no    Safety  Does the patient have any safety concerns (peer or home)?:  no  Does the patient use safety belts, helmets and other safety equipment?:  yes    Sex  Is the patient sexually active?:  no    REVIEW OF SYSTEMS  History obtained from mother and child.  General:  "Negative  Allergies: He has had significant seasonal allergy symptoms this spring. His eyes have been itchy. He uses eye drops but does not like to use them. He has not tried using a nasal steroid spray in the past. His asthma has been well-controlled with Singulair.  Dental: He brushes his teeth daily. He has needed a few sedated dental surgeries in the past.  Autism Spectrum Disorder: Mom notes he has been managing his behaviors well in terms of impulse control and appropriate actions. His behavior has been helped by daily risperidone use.  His parents have no other health or developmental concerns.    MEASUREMENTS  Height:  5' 10.25\" (1.784 m)  Weight: 155 lb 8 oz (70.5 kg)  BMI: Body mass index is 22.15 kg/(m^2).  Blood Pressure: 122/76  Growth percentile SmartLinks can only be used for patients less than 20 years old.    PHYSICAL EXAM  General: Awake, Alert and Cooperative.   Head: Normocephalic and Atraumatic.   Eyes: PERRL and EOMI.   ENT: Normal pearly TMs bilaterally and Oropharynx clear. Tonsils normal. Normal dentition.   Neck: Supple and Thyroid without enlargement or nodules. No lymphadenopathy.   Chest: Chest wall normal.   Lungs: Clear to auscultation bilaterally.   Heart: Regular rate and rhythm and no murmurs. Femoral pulses 2+ bilaterally.   Abdomen: Soft, nontender, nondistended, no mass palpable, and no hepatosplenomegaly.   : Normal external male genitalia and sexual maturity rating 5.   Spine: Spine straight without curvature noted.   Musculoskeletal: Moving all extremities and No pain in the extremities.   Neuro: Alert and oriented times 3, Normal tone in upper and lower extremities, Grossly normal and DTRs +2 bilaterally.   Skin: Inflamma Acne present on face and back. No cystic lesions seen today.     ADDITIONAL HISTORY SUMMARIZED (2): None.  DECISION TO OBTAIN EXTRA INFORMATION (1): None.   RADIOLOGY TESTS (1): None.  LABS (1): None.  MEDICINE TESTS (1): None.  INDEPENDENT REVIEW (2 " each): None.     The visit lasted a total of 30 minutes face to face with the patient. Over 50% of the time was spent counseling and educating the patient about his overall health and development.    I, Ed Huitron, am scribing for and in the presence of, Dr. Sepulveda.    I, Charlotte Sepulveda, personally performed the services described in this documentation, as scribed by Ed Huitron in my presence, and it is both accurate and complete.    Total Data Points: 0

## 2021-06-12 NOTE — TELEPHONE ENCOUNTER
RN cannot approve Refill Request    RN can NOT refill this medication med is not covered by policy/route to provider. Last office visit: 1/16/2020 Charlotte Sepulveda MD Last Physical: 6/15/2018 Last MTM visit: Visit date not found Last visit same specialty: 1/16/2020 Charlotte Sepulveda MD.  Next visit within 3 mo: Visit date not found  Next physical within 3 mo: Visit date not found      Tali Corbin, [facility] Triage/Med Refill 10/4/2020    Requested Prescriptions   Pending Prescriptions Disp Refills     risperiDONE (RISPERDAL) 1 MG tablet [Pharmacy Med Name: RISPERIDONE 1MG TABLETS] 180 tablet 0     Sig: TAKE 1 TABLET BY MOUTH IN THE MORNING AND 1 TABLET BY MOUTH IN THE EVENING       There is no refill protocol information for this order        olopatadine (PATANOL) 0.1 % ophthalmic solution [Pharmacy Med Name: OLOPATADINE 0.1% OPTH SOLN 5ML] 5 mL 6     Sig: INSTILL 1 DROP IN BOTH EYES TWICE DAILY       Opthalmic Allergy Drops Refill Protocol Passed - 10/3/2020 10:29 AM        Passed - Patient has had office visit/physical in last 12 months     Last office visit with prescriber/PCP: 1/16/2020 Cahrlotte Sepulveda MD OR same dept: 1/16/2020 Charlotte Sepulveda MD OR same specialty: 1/16/2020 Charlotte Sepulveda MD  Last physical: 6/15/2018 Last MTM visit: Visit date not found   Next visit within 3 mo: Visit date not found  Next physical within 3 mo: Visit date not found  Prescriber OR PCP: Charlotte Sepulveda MD  Last diagnosis associated with med order: 1. Autistic Disorder  - risperiDONE (RISPERDAL) 1 MG tablet [Pharmacy Med Name: RISPERIDONE 1MG TABLETS]; TAKE 1 TABLET BY MOUTH IN THE MORNING AND 1 TABLET BY MOUTH IN THE EVENING  Dispense: 180 tablet; Refill: 0    2. Allergic eye reaction  - olopatadine (PATANOL) 0.1 % ophthalmic solution [Pharmacy Med Name: OLOPATADINE 0.1% OPTH SOLN 5ML]; INSTILL 1 DROP IN BOTH EYES TWICE DAILY  Dispense: 5 mL; Refill: 6    If protocol  passes may refill for 12 months if within 3 months of last provider visit (or a total of 15 months).

## 2021-06-13 NOTE — TELEPHONE ENCOUNTER
Refill request on the Risperidone from Parmjit on [facility].  Last Physical with Dar on 10/29/20. Rupinder Pollard LPN

## 2021-06-18 NOTE — PROGRESS NOTES
" [facility] Well Child Check    ASSESSMENT & PLAN  Krishna Morel is a 22 y.o. who has normal growth and abnormal development:  Autism Spectrum Disorder and Expressive Speech Delay.    Diagnoses and all orders for this visit:    Encounter for annual health examination  -     Glycosylated Hemoglobin A1C; Future  -     HM1(CBC and Differential); Future  -     Vitamin D, Total (25-Hydroxy); Future  -     Hepatic Profile; Future  -     Lipid Profile; Future    Environmental allergies  -     montelukast (SINGULAIR) 10 mg tablet; TAKE 1 TABLET(10 MG) BY MOUTH BEDTIME  Dispense: 90 tablet; Refill: 2    Autistic Disorder    Learning Disability - Expressive Language    Other orders  -     Cancel: PHQ9 Depression Screen  -     risperiDONE (RISPERDAL) 1 MG tablet; Take 0.5 to 1 mg by mouth in morning and 1 mg in pm.  Dispense: 180 tablet; Refill: 3  -     olopatadine (PATANOL) 0.1 % ophthalmic solution; Administer 1 drop to both eyes 2 (two) times a day.  Dispense: 5 mL; Refill: 6    Routine blood work due to chronic use of rispiridone.  AT this time, he is taking 0.5-1mg in a.m. And 1 mg in pm.  He can perseverated and get \"obsessive\" behaviors over certain things at time. Currently family is working on behavior management but at in the future we will consider use of sertraline if needed. Mom to call me with update if desires to try sertraline in future.    Refill treatment for allergic rhinoconjunctivitis with montelukast and olopatadine.  Consider therapy at [facility] for behavioral management with obsessive behaviors, although in time may be good benefit with both medication and behavioral management.  I will continue to see Mike for the next 2-3 years before transition to adult medicine.    Return to clinic in 1 year for a Well Child Check or sooner as needed    IMMUNIZATIONS/LABS  No immunizations due today. and Labs ordered. Results pending.    REFERRALS  Dental:  Recommend routine dental care as appropriate., The patient " has already established care with a dentist.  Other:  No referrals were made at this time.    ANTICIPATORY GUIDANCE  I have reviewed age appropriate anticipatory guidance.  Social:  Friends and Changes and Choices  Parenting:  [place]/Dependence and Chores  Nutrition:  Age Specific Nutritional Needs  Play and Communication:  Hobbies and Read Books  Health:  Activity (>45 min/day), Sleep, Sun Screen and Dental Care  Safety:  Seat Belts, Swimming Safety, Bike/Motorcycle Helmets and Outdoor Safety Avoiding Sun Exposure  Sexuality:  Safe Sex    HEALTH HISTORY  Do you have any concerns that you'd like to discuss today?: changes to medications    Behavior: He typically takes one tablet of Risperdal in the morning and one tablet of it in the evening which has been a good regimen for him. There are times when he occasionally becomes irritated by random events so mom has found taking one tablet in the morning and at night has been most effective.    Allergies: He takes Singulair daily during peak allergy season as well as uses Flonase to manage his symptoms. He uses allergy relief eye drops as needed.    Acne: He no longer uses Retinol-A cream to manage his acne. He has not tried using adapalene cream.    Roomed by: danay    Accompanied by Mother    Refills needed? No    Do you have any forms that need to be filled out? No      Do you have any significant health concerns in your family history?: No  Family History   Problem Relation Age of Onset     Hypertension Mother      No Medical Problems Father      OCD Sister      Migraines Sister      Since your last visit, have there been any major changes in your family, such as a move, job change, separation, divorce, or death in the family?: No  Has a lack of transportation kept you from medical appointments?: No    Home  Who lives in your home?:  See narrative below.  Social History     Social History Narrative    Mom, dad, sister     Do you have any concerns about losing  your housing?: No  Is your housing safe and comfortable?: Yes    Do you have any trouble with sleep?:  No, he falls asleep quickly in the evening. He sleeps soundly at night. He gets sufficient sleep each night. He has a good daytime energy level.    Education  What school do you child attend?:  [employer]  How do you perform in school (grades, behavior, attention, homework?: Well. He has transitioned to a program at [employer] recently. He enjoys [employer] and learning there. He will be working on life skills and the program will assist him with job placement. He also lives in a group home a few times per week with three other peers. His parents are working on getting him set up with [employer] to help him get around. Mom notes he loves to do chores at home so his parents have been working with him on being less obsessive-compulsive about completing them.    Eating He has a good appetite. He loves to cook. He does not like salads but overall eats a healthy, balanced diet with a variety of fruits, vegetables, and proteins. He drinks water and juice mainly.  Do you eat regular meals including fruits and vegetables?:  yes  What are you drinking (cow's milk, water, soda, juice, sports drinks, energy drinks, etc)?: water and juice  Have you been worried that you don't have enough food?: No  Do you have concerns about your body or appearance?:  No    Activities  Do you have friends?:  Yes, he gets along well with his peers and has good friends with whom he enjoys spending time.  Do you get at least one hour of physical activity per day?:  yes  How many hours a day are you in front of a screen other than for schoolwork (computer, TV, phone)?:  3  What do you do for exercise?:  Swimming, walking   Do you have interest/participate in community activities/volunteers/school sports?:  yes    VISION/HEARING  Vision: Patient is already followed by a vision specialist  Hearing: Completed. See results.    No exam data  "present    TB Risk Assessment:  The patient and/or parent/guardian answer positive to:  patient and/or parent/guardian answer 'no' to all screening TB questions    Dyslipidemia Risk Screening  Have either of your parents or any of your grandparents had a stroke or heart attack before age 55?: No  Any parents with high cholesterol or currently taking medications to treat?: No     Dental  When was the last time you saw the dentist?: 0-3 months ago   Fluoride not applied today.  Last fluoride varnish application was within the past 3 months.      Patient Active Problem List   Diagnosis     Paronychia     Autistic Disorder     Learning Disability - Expressive Language     Drugs  Does the patient use tobacco/alcohol/drugs?:  No. In group respite home he can have 1 alcoholic beverage if he wants.  He prefers root beer.    Safety  Does the patient have any safety concerns (peer or home)?:  no  Does the patient use safety belts, helmets and other safety equipment?:  yes    Sex  Have you ever had sex?:  No    REVIEW OF SYSTEMS  History obtained from mother and child.  General: Negative  Dental: He brushes his teeth daily. He does not have dental caries. His dentist has recommended he have his wisdom teeth extracted.  His parents have no other health or developmental concerns.    MEASUREMENTS  Height:  5' 11\" (1.803 m)  Weight: 165 lb 11.2 oz (75.2 kg)  BMI: Body mass index is 23.11 kg/(m^2).  Blood Pressure: 120/80  Growth percentile SmartLinks can only be used for patients less than 20 years old.    PHYSICAL EXAM  Constitutional: He appears well-developed and well-nourished. He is awake, alert, and cooperative.  HEENT: Head: Normocephalic. Atraumatic.   Right Ear: Normal, pearly tympanic membrane; external ear and canal normal.    Left Ear: Normal, pearly tympanic membrane, external ear and canal normal.    Nose: Nose normal.    Mouth/Throat: Mucous membranes are moist. Oropharynx is clear. Tonsils +1 bilaterally. Normal " dentition.   Eyes: Conjunctivae and lids are normal. PERRL, EOMI.  Neck: Supple without lymphadenopathy or tenderness. No thyromegaly or nodules.   Cardiovascular: Normal rate and regular rhythm. No murmur heard. Femoral pulses 2+ bilaterally.  Pulmonary: Clear to auscultation bilaterally. Effort and breath sounds normal. There is normal air entry.  Chest: Normal chest wall.    Abdominal: Soft, nontender, nondistended. No hepatosplenomegaly. Bowel sounds are normal.   Genitourinary: Normal external male genitalia. Testes descended bilaterally. He is circumcised. SMR 5.   Musculoskeletal: Moving all extremities with normal range of motion. Normal strength and tone. No abnormalities. No pain in the extremities.  Spine: Spine straight without curvature noted.  Neurological: He is alert and oriented x3. He has normal reflexes. Normal tone and DTRs +2 bilaterally.  Psychiatric: He has a normal mood and affect. His speech and behavior are normal.  Skin: Clear. No rashes or lesions noted.    ADDITIONAL HISTORY SUMMARIZED (2): None.  DECISION TO OBTAIN EXTRA INFORMATION (1): None.   RADIOLOGY TESTS (1): None.  LABS (1): Ordered and reviewed labs.  MEDICINE TESTS (1): None.  INDEPENDENT REVIEW (2 each): None.     The visit lasted a total of 30 minutes that I spent face to face with the patient. Over 50% of the time was spent counseling and educating the patient about his overall health and development.    I, Ed Huitron, am scribing for and in the presence of, Dr. Sepulveda.    Charlotte WEI MD, personally performed the services described in this documentation, as scribed by Ed Huitron in my presence, and it is both accurate and complete.    Total Data Points: 1

## 2021-06-29 NOTE — PROGRESS NOTES
Progress Notes by Rhonda Dodge MD at 10/29/2020  1:00 PM     Author: Rhonda Dodge MD Service: -- Author Type: Physician    Filed: 10/29/2020  2:18 PM Encounter Date: 10/29/2020 Status: Signed    : Rhonda Dodge MD (Physician)       MALE PREVENTATIVE EXAM    Assessment and Plan:     Patient has been advised of split billing requirements and indicates understanding: Yes    Krishna was seen today for establish care and annual exam.    Encounter to establish care  Medical hx reviewed today in detail    Routine general medical examination at a health care facility  - Vision today 20/20 in both eyes, then 20/30 in just the Right eye alone  - Hearing test was challenging for participation, attempted but not completed; family declines hearing concerns at this time  -     Comprehensive Metabolic Panel  -     Lipid Cascade  -     Glycosylated Hemoglobin A1c  -     Vitamin D, Total (25-Hydroxy)  -     HM2(CBC w/o Differential)    Autistic Disorder  Learning Disability - Expressive Language  Currently behavioral issues have generally been well managed.    He is completed the Defeat Autism Now (DAN!) protocol-continues with dairy free, gluten free, soy free diet and dietary supplements  -We reviewed side effect profile of the Risperdal and need for annual eye exam as well as laboratory monitoring which we will collect today.  - Form filled out for his [facility] day program.  - Descalation measures: Needs VERBAL prompting with clear instructions; do not touch or this escalates behaviors  - Has additional respite services in place, declines need for therapy  -     risperiDONE (RISPERDAL) 1 MG tablet; TAKE 1 TABLET BY MOUTH IN THE MORNING AND 1 TABLET BY MOUTH IN THE EVENING. Ok to use an additional 0.5-1 tablet in the afternoon as needed.  -     Comprehensive Metabolic Panel  -     Lipid Cascade  -     Glycosylated Hemoglobin A1c  -     Vitamin D, Total (25-Hydroxy)  -     HM2(CBC w/o  "Differential)    Non-seasonal allergic rhinitis due to pollen  Continues on Flonase, Patanol eyedrops and Singulair.  Has gone through oral immunotherapy in [place].  Declines subQ IT.    Next follow up:  Return in about 1 year (around 10/29/2021) for Annual physical.    Immunization Review  Adult Imm Review: Declines immunizations today  Reviewed but they declined HPV vaccination series and have looked into this previously.  Risks and benefits reviewed and agree this is a reasonable decision-currently under 24-hour supervision and no anticipation that this would change down the road.  Not sexually active.     I discussed the following with the patient:   Adult Healthy Living: Importance of regular exercise  Healthy nutrition  Getting adequate sleep  Stress management  Use of seat belts  Supplement use  Herbal medications/alternative medical therapies      I spent 25 minutes with the patient specifically addressing medical issues and problems as above, >50% of which was in counseling.  This was in addition to routine preventative health discussions.         Subjective:   Chief Complaint: Krishna Morel is an 25 y.o. male here for a preventative health visit.    Patient has been advised of split billing requirements and indicates understanding: Yes     HPI:    Chief Complaint   Patient presents with   ? Establish Care   ? Annual Exam     not fasting, no concerns, does have paperwork for [facility], discuss risperidone dosage     \"Mike\"    He has graduated from [place] and also a transition program from [facility]. Per mom he ranks about 3yo for IQ tests.   As a youngster he had a lot emotional lability.   DAN protocol: gluten free diet, with occasional  Treats; healthy diet otherwise (\"doesn't like salad though\")  Enjoys cooking.  He attends a [employer] day program. Currently 3hr 3x/week; pre-COVID this was a lot more time.    He does attend respite care with a family[facility]- 3-4 nights per month, and " "2 weeks for a extended vacation for parents.    Used to have a job at [employer] until COVID pandemic. Loved to wash the dishes, very good at tasks/chores and feeling productive. With being home more he has loved being with his mom more and not sure he wants to go back to work.     Volunteering at the [employer].  Family is considering buying a house with 2 other families and having staff come live there. This is a future 5 year goal.   Exercises daily. Fitness journey with COVID with good weight reduction.    His grandparents \"gave him a townhome in [place]\" for the family to visit.    He does have a lot of OCD, repetitive worry talk. Prozac made him more anxious.  Hydroxyzine did help initially for sleep (has used 5 tabs total); using PRN only at this point.  [facility] and when he left,  Dr. Sepulveda had taken over the risperidone. He currently uses 1mg in AM and 1mg in PM. On hard days he gets an additional 0.5-1mg during the afternoon.     Has done a lot of family counseling, OT, speech therapy.   Has seen a provider for medical cannabis consultation but cannot afford this currently. His sister finds it effective for her anxiety/OCD.    He had a vision check up 5 years ago with \"poor vision\". Understands plan for annual check up given Risperdal use.     He has chronic allergies; managed with flonase, patanol eye drops and Singulair.  He previously has done oral immunotherapy in [place]. Declines option for injection based IT.      Social History     Social History Narrative    Lives with Mom, dad, older sister.    Enjoys cooking. Exercises daily. Loved to wash the dishes, very good at tasks/chores and feeling productive.     Volunteering at the [employer].    He attends a [employer] day program. Currently 3hr 3x/week; pre-COVID this was a lot more time.    Used to have a job at [employer] until COVID pandemic.     Family is considering buying " "a house with 2 other families and having staff come live there. This is a future 5 year goal.     Rhonda Dodge MD               Healthy Habits  Are you taking a daily aspirin? No  Do you typically exercising at least 40 min, 3-4 times per week?  Yes  Do you usually eat at least 4 servings of fruit and vegetables a day, include whole grains and fiber and avoid regularly eating high fat foods? Yes  Have you had an eye exam in the past two years? NO  Do you see a dentist twice per year? Yes  Do you have any concerns regarding sleep? No    Safety Screen  If you own firearms, are they secured in a locked gun cabinet or with trigger locks? The patient does not own any firearms  No data recorded    Review of Systems:  Please see above.  The rest of the review of systems are negative for all systems.     Cancer Screening     Patient has no health maintenance due at this time          Patient Care Team:  Charlotte Sepulveda MD as PCP - General  Charlotte Sepulveda MD as Assigned PCP        History     Reviewed By Date/Time Sections Reviewed    Rhonda Dodge MD 10/29/2020  2:08 PM Medical, Surgical, Tobacco, Family    Dolores Ramirez LPN 10/29/2020  1:07 PM Tobacco            Objective:   Vital Signs:   Visit Vitals  /68 (Patient Site: Right Arm, Patient Position: Sitting, Cuff Size: Adult Regular)   Pulse 96   Ht 5' 10.75\" (1.797 m)   Wt 167 lb 8 oz (76 kg)   BMI 23.53 kg/m           PHYSICAL EXAM  Physical Exam:  Constitutional: Patient is oriented to person, place, and time. Patient appears well-developed and well-nourished. No distress.  Very pleasant, verbal ability of a 5-year-old, speaking in 3-5 word sentences  Head: Normocephalic and atraumatic.   Ears: TMs normal bilaterally   Nose: no discharge or polyps  Mouth/Throat: Oropharynx is clear and moist. No oropharyngeal exudate.   Eyes: Conjunctivae and EOM are normal. Pupils are equal, round, and reactive to light. No scleral icterus " or discharge.  Neck: Neck supple. No JVD present. No tracheal deviation. No thyromegaly.     Cardiovascular: Normal rate, regular rhythm, normal heart sounds and intact distal pulses. No murmur heard.   Pulmonary/Chest: Effort normal and breath sounds are clear to auscultation bilaterally.  Abdominal: Soft. Bowel sounds are normal. Nodistension and no mass.  There is no tenderness.   Lymphadenopathy:  No cervical adenopathy.   Neurological: Alert and oriented to person, place. 2+ patellar DTRs. No gross cranial nerve deficit. Coordination normal.   Skin: Skin is warm and dry. No rash noted.   Psychiatric:  Normal mood and affect.  Limited insight and judgment given underlying autistic/learning disorders.                 Medication List          Accurate as of October 29, 2020  2:08 PM. If you have any questions, ask your nurse or doctor.            CHANGE how you take these medications    risperiDONE 1 MG tablet  Also known as: RisperDAL  INSTRUCTIONS: TAKE 1 TABLET BY MOUTH IN THE MORNING AND 1 TABLET BY MOUTH IN THE EVENING. Ok to use an additional 0.5-1 tablet in the afternoon as needed.  What changed: additional instructions  Changed by: Rhonda Dodge MD           CONTINUE taking these medications    Fish Oil 300-1,000 mg capsule  INSTRUCTIONS: Take 2 g by mouth daily.  Generic drug: fish oil-omega-3 fatty acids        fluticasone propionate 50 mcg/actuation nasal spray  Also known as: FLONASE  INSTRUCTIONS: Apply 1 spray into each nostril daily.        hydrOXYzine pamoate 25 MG capsule  Also known as: VISTARIL  INSTRUCTIONS: Take 1-2 pills by mouth at night to help with sleep onset.        montelukast 10 mg tablet  Also known as: SINGULAIR  INSTRUCTIONS: TAKE 1 TABLET(10 MG) BY MOUTH BEDTIME        olopatadine 0.1 % ophthalmic solution  Also known as: PATANOL  INSTRUCTIONS: INSTILL 1 DROP IN BOTH EYES TWICE DAILY        * UNABLE TO FIND  INSTRUCTIONS: Med Name:INDIRA Vitamin supplement        * UNABLE TO  FIND  INSTRUCTIONS: Med Name: Dimethylglycine with folic acid        VITAMIN D3 ORAL  INSTRUCTIONS: Take by mouth.            * This list has 2 medication(s) that are the same as other medications prescribed for you. Read the directions carefully, and ask your doctor or other care provider to review them with you.            STOP taking these medications    FLUoxetine 10 MG tablet  Also known as: PROzac  Stopped by: Rhonda Dodge MD     FLUoxetine 20 MG tablet  Also known as: PROzac  Stopped by: Rhonda Dodge MD           Where to Get Your Medications      These medications were sent to [id number] [place], [place] - [address] AT [facility] [place] [address] [address], [facility] [address]    Hours: 24-hours Phone: [phone]     risperiDONE 1 MG tablet         Additional Screenings Completed Today:

## 2021-07-17 DIAGNOSIS — F84.0 ACTIVE AUTISTIC DISORDER: ICD-10-CM

## 2021-07-19 RX ORDER — RISPERIDONE 1 MG/1
TABLET ORAL
Qty: 110 TABLET | Refills: 1 | Status: SHIPPED | OUTPATIENT
Start: 2021-07-19 | End: 2021-10-04

## 2021-09-18 ENCOUNTER — HEALTH MAINTENANCE LETTER (OUTPATIENT)
Age: 26
End: 2021-09-18

## 2021-10-01 ENCOUNTER — DOCUMENTATION ONLY (OUTPATIENT)
Dept: OTHER | Facility: CLINIC | Age: 26
End: 2021-10-01

## 2021-10-01 DIAGNOSIS — F84.0 ACTIVE AUTISTIC DISORDER: ICD-10-CM

## 2021-10-03 NOTE — TELEPHONE ENCOUNTER
"Routing refill request to provider for review/approval because:  Failed - Recent (6mo) visit    Last Written Prescription Date:  12/1/2020  Last Fill Quantity: 110,  # refills: 1   Last office visit provider:  10/29/2020 Dr. Dodge     risperiDONE (RISPERDAL) 1 MG tablet 110 tablet 1 12/1/2020  No   Sig: TAKE 1 TABLET BY MOUTH IN THE MORNING AND 1 TABLET BY MOUTH IN THE EVENING. Ok to use an additional 0.5-1 tablet in the afternoon as needed.   Sent to pharmacy as: risperiDONE 1 mg tablet (RisperDAL)   E-Prescribing Status: Receipt confirmed by pharmacy (12/1/2020  4:47 PM CST         Requested Prescriptions   Pending Prescriptions Disp Refills     risperiDONE (RISPERDAL) 1 MG tablet [Pharmacy Med Name: RISPERIDONE 1MG TABLETS] 110 tablet 1     Sig: TAKE 1 TABLET BY MOUTH EVERY MORNING AND EVERY EVENING. MAY USE ADDITIONAL 1/2 TO 1 TABLET EVERY AFTERNOON AS NEEDED       Antipsychotic Medications Failed - 10/1/2021  9:06 AM        Failed - Recent (6 mo) or future (30 days) visit within the authorizing provider's specialty     Patient had office visit in the last 6 months or has a visit in the next 30 days with authorizing provider or within the authorizing provider's specialty.  See \"Patient Info\" tab in inbasket, or \"Choose Columns\" in Meds & Orders section of the refill encounter.            Passed - Blood pressure under 140/90 in past 12 months     BP Readings from Last 3 Encounters:   10/29/20 110/68   01/16/20 106/62                 Passed - Patient is 12 years of age or older        Passed - Lipid panel on file within the past 12 months     Recent Labs   Lab Test 10/29/20  1411   CHOL 138   TRIG 201*   HDL 42   LDL 56               Passed - CBC on file in past 12 months     Recent Labs   Lab Test 10/29/20  1411   WBC 7.3   RBC 5.42   HGB 15.9   HCT 46.9                    Passed - Heart Rate on file within past 12 months     Pulse Readings from Last 3 Encounters:   10/29/20 96               Passed - " A1c or Glucose on file in past 12 months     Recent Labs   Lab Test 10/29/20  1411   GLC 86   A1C 5.0       Please review patients last 3 weights. If a weight gain of >10 lbs exists, you may refill the prescription once after instructing the patient to schedule an appointment within the next 30 days.    Wt Readings from Last 3 Encounters:   10/29/20 76 kg (167 lb 8 oz)   01/16/20 79.5 kg (175 lb 4.8 oz)   05/16/19 77.7 kg (171 lb 4.8 oz)             Passed - Medication is active on med list             Henrietta Aguilar RN 10/02/21 10:35 PM

## 2021-10-04 ENCOUNTER — DOCUMENTATION ONLY (OUTPATIENT)
Dept: OTHER | Facility: CLINIC | Age: 26
End: 2021-10-04

## 2021-10-04 RX ORDER — RISPERIDONE 1 MG/1
TABLET ORAL
Qty: 110 TABLET | Refills: 1 | Status: SHIPPED | OUTPATIENT
Start: 2021-10-04 | End: 2021-11-05

## 2021-10-11 DIAGNOSIS — H57.9 ALLERGIC EYE REACTION: ICD-10-CM

## 2021-10-11 RX ORDER — OLOPATADINE HYDROCHLORIDE 1 MG/ML
SOLUTION/ DROPS OPHTHALMIC
Qty: 5 ML | Refills: 6 | Status: SHIPPED | OUTPATIENT
Start: 2021-10-11 | End: 2021-11-05

## 2021-10-26 ENCOUNTER — DOCUMENTATION ONLY (OUTPATIENT)
Dept: OTHER | Facility: CLINIC | Age: 26
End: 2021-10-26

## 2021-11-05 ENCOUNTER — OFFICE VISIT (OUTPATIENT)
Dept: FAMILY MEDICINE | Facility: CLINIC | Age: 26
End: 2021-11-05
Payer: COMMERCIAL

## 2021-11-05 VITALS
DIASTOLIC BLOOD PRESSURE: 72 MMHG | BODY MASS INDEX: 24.18 KG/M2 | HEIGHT: 71 IN | WEIGHT: 172.7 LBS | SYSTOLIC BLOOD PRESSURE: 110 MMHG | HEART RATE: 84 BPM

## 2021-11-05 DIAGNOSIS — F84.0 ACTIVE AUTISTIC DISORDER: ICD-10-CM

## 2021-11-05 DIAGNOSIS — H57.9 ALLERGIC EYE REACTION: ICD-10-CM

## 2021-11-05 DIAGNOSIS — R74.8 ELEVATED ALKALINE PHOSPHATASE LEVEL: ICD-10-CM

## 2021-11-05 DIAGNOSIS — F41.9 ANXIETY: ICD-10-CM

## 2021-11-05 DIAGNOSIS — Z00.00 ROUTINE GENERAL MEDICAL EXAMINATION AT A HEALTH CARE FACILITY: Primary | ICD-10-CM

## 2021-11-05 DIAGNOSIS — Z51.81 ENCOUNTER FOR THERAPEUTIC DRUG MONITORING: ICD-10-CM

## 2021-11-05 DIAGNOSIS — Z91.09 ENVIRONMENTAL ALLERGIES: ICD-10-CM

## 2021-11-05 LAB
ALBUMIN SERPL-MCNC: 4.7 G/DL (ref 3.5–5)
ALP SERPL-CCNC: 159 U/L (ref 45–120)
ALT SERPL W P-5'-P-CCNC: 36 U/L (ref 0–45)
ANION GAP SERPL CALCULATED.3IONS-SCNC: 12 MMOL/L (ref 5–18)
AST SERPL W P-5'-P-CCNC: 24 U/L (ref 0–40)
BILIRUB SERPL-MCNC: 0.5 MG/DL (ref 0–1)
BUN SERPL-MCNC: 22 MG/DL (ref 8–22)
CALCIUM SERPL-MCNC: 9.7 MG/DL (ref 8.5–10.5)
CHLORIDE BLD-SCNC: 103 MMOL/L (ref 98–107)
CHOLEST SERPL-MCNC: 146 MG/DL
CO2 SERPL-SCNC: 23 MMOL/L (ref 22–31)
CREAT SERPL-MCNC: 0.84 MG/DL (ref 0.7–1.3)
ERYTHROCYTE [DISTWIDTH] IN BLOOD BY AUTOMATED COUNT: 12.9 % (ref 10–15)
FASTING STATUS PATIENT QL REPORTED: NO
GFR SERPL CREATININE-BSD FRML MDRD: >90 ML/MIN/1.73M2
GLUCOSE BLD-MCNC: 85 MG/DL (ref 70–125)
HBA1C MFR BLD: 5.3 % (ref 0–5.6)
HCT VFR BLD AUTO: 43.9 % (ref 40–53)
HDLC SERPL-MCNC: 51 MG/DL
HGB BLD-MCNC: 15 G/DL (ref 13.3–17.7)
LDLC SERPL CALC-MCNC: 69 MG/DL
MCH RBC QN AUTO: 28.7 PG (ref 26.5–33)
MCHC RBC AUTO-ENTMCNC: 34.2 G/DL (ref 31.5–36.5)
MCV RBC AUTO: 84 FL (ref 78–100)
PLATELET # BLD AUTO: 238 10E3/UL (ref 150–450)
POTASSIUM BLD-SCNC: 4 MMOL/L (ref 3.5–5)
PROT SERPL-MCNC: 7.6 G/DL (ref 6–8)
RBC # BLD AUTO: 5.22 10E6/UL (ref 4.4–5.9)
SODIUM SERPL-SCNC: 138 MMOL/L (ref 136–145)
TRIGL SERPL-MCNC: 128 MG/DL
WBC # BLD AUTO: 6.8 10E3/UL (ref 4–11)

## 2021-11-05 PROCEDURE — 36415 COLL VENOUS BLD VENIPUNCTURE: CPT | Performed by: FAMILY MEDICINE

## 2021-11-05 PROCEDURE — 83036 HEMOGLOBIN GLYCOSYLATED A1C: CPT | Performed by: FAMILY MEDICINE

## 2021-11-05 PROCEDURE — 80053 COMPREHEN METABOLIC PANEL: CPT | Performed by: FAMILY MEDICINE

## 2021-11-05 PROCEDURE — 82306 VITAMIN D 25 HYDROXY: CPT | Performed by: FAMILY MEDICINE

## 2021-11-05 PROCEDURE — 99395 PREV VISIT EST AGE 18-39: CPT | Performed by: FAMILY MEDICINE

## 2021-11-05 PROCEDURE — 80061 LIPID PANEL: CPT | Performed by: FAMILY MEDICINE

## 2021-11-05 PROCEDURE — 85027 COMPLETE CBC AUTOMATED: CPT | Performed by: FAMILY MEDICINE

## 2021-11-05 RX ORDER — RISPERIDONE 1 MG/1
TABLET ORAL
Qty: 110 TABLET | Refills: 1 | Status: SHIPPED | OUTPATIENT
Start: 2021-11-05 | End: 2022-01-06

## 2021-11-05 RX ORDER — HYDROXYZINE PAMOATE 25 MG/1
CAPSULE ORAL
Qty: 60 CAPSULE | Refills: 3 | Status: SHIPPED | OUTPATIENT
Start: 2021-11-05 | End: 2022-05-26

## 2021-11-05 RX ORDER — OLOPATADINE HYDROCHLORIDE 1 MG/ML
SOLUTION/ DROPS OPHTHALMIC
Qty: 5 ML | Refills: 6 | Status: SHIPPED | OUTPATIENT
Start: 2021-11-05 | End: 2022-12-14

## 2021-11-05 RX ORDER — MONTELUKAST SODIUM 10 MG/1
TABLET ORAL
Qty: 90 TABLET | Refills: 3 | Status: SHIPPED | OUTPATIENT
Start: 2021-11-05 | End: 2022-12-14

## 2021-11-05 ASSESSMENT — MIFFLIN-ST. JEOR: SCORE: 1786.52

## 2021-11-05 NOTE — PROGRESS NOTES
"SUBJECTIVE:   CC: Krishna Morel is an 26 year old male who presents for preventative health visit.       Patient has been advised of split billing requirements and indicates understanding: Yes  Healthy Habits:     Getting at least 3 servings of Calcium per day:  Yes    Bi-annual eye exam:  NO    Dental care twice a year:  Yes    Sleep apnea or symptoms of sleep apnea:  None    Diet:  Gluten-free/reduced    Frequency of exercise:  4-5 days/week    Duration of exercise:  15-30 minutes    Taking medications regularly:  Yes    Medication side effects:  None    PHQ-2 Total Score: 0    Additional concerns today:  No    Chief Complaint   Patient presents with     Physical     would like to renew medications, has been having increased behaviors with COVID and has appointments made to consider medical marijuana       Autism: With the covid pandemic mom notes that Mike's OCD and anxiety has gotten worse. More repetitive talk. Has to mail cards \"right now\" if he wants to write a card. Starting to do more hitting of his forehead or pulling ear/hair.  Does have respite services and does better there.  Does a day program [facility] in [place]; outings and cooking classes  Not currently in therapy- \"aged out\"; his mom is an RN and doesn't work outside the house so she is with her son a lot; he helps a lot with household chores  Not currently working     Mom has an appt schedule with another provider to discuss medical cannabis for this.   Has been doing risperdal 1mg in AM/PM and an additional 0.5mg in the mid afternoon.  Mike helps prepare his med pack  Rarely using hydroxyzine but helpful to have on hand for long trips.     Monteleukast helping his seasonal allergies. Itchy watery eyes her his main sx.     Takes VitD supplemnt    Not interested in flu shot, COVID or tdap.  Has a complicated hx of vaccine.     Social History     Social History Narrative    Lives with Mom (retired RN),  dad ([employer] doc), older sister.  Enjoys " "cooking. Exercises daily. Loved to wash the dishes, very good at tasks/chores and feeling productive.   Volunteering at the food shelter.  He attends a [employer] [facility] day program. Currently 3hr 3x/week ; pre-COVID this was a lot more time.  Used to have a job at [employer] until COVID pandemic.   Family is considering buying a house with 2 other families and having staff come live there. This is a future 5 year goal.   Rhonda Dodge MD                Today's PHQ-2 Score:   PHQ-2 ( 1999 Pfizer) 11/3/2021   Q1: Little interest or pleasure in doing things 0   Q2: Feeling down, depressed or hopeless 0   PHQ-2 Score 0   Q1: Little interest or pleasure in doing things Not at all   Q2: Feeling down, depressed or hopeless Not at all   PHQ-2 Score 0       Abuse: Current or Past(Physical, Sexual or Emotional)- No  Do you feel safe in your environment? Yes        Social History     Tobacco Use     Smoking status: Never Smoker     Smokeless tobacco: Never Used   Substance Use Topics     Alcohol use: No     If you drink alcohol do you typically have >3 drinks per day or >7 drinks per week? Not applicable    No flowsheet data found.    Last PSA: No results found for: PSA    Reviewed orders with patient. Reviewed health maintenance and updated orders accordingly - Yes    Reviewed and updated as needed this visit by clinical staff  Tobacco  Allergies  Meds  Problems  Med Hx  Surg Hx  Fam Hx         Reviewed and updated as needed this visit by Provider  Tobacco  Allergies  Meds  Problems  Med Hx  Surg Hx  Fam Hx          Review of Systems  Per HPI or negative    OBJECTIVE:   /72 (BP Location: Left arm, Patient Position: Sitting, Cuff Size: Adult Regular)   Pulse 84   Ht 1.797 m (5' 10.75\")   Wt 78.3 kg (172 lb 11.2 oz)   BMI 24.26 kg/m      Physical Exam  GENERAL: healthy, alert and no distress  NECK: no adenopathy, no asymmetry, masses, or scars and thyroid normal to palpation  RESP: " lungs clear to auscultation - no rales, rhonchi or wheezes  CV: regular rate and rhythm, normal S1 S2, no S3 or S4, no murmur, click or rub, no peripheral edema and peripheral pulses strong  ABDOMEN: soft, nontender, no hepatosplenomegaly, no masses and bowel sounds normal  MS: no gross musculoskeletal defects noted, no edema    Diagnostic Test Results:  Labs reviewed in Epic    ASSESSMENT/PLAN:   Krishna was seen today for physical.    Diagnoses and all orders for this visit:    Routine general medical examination at a health care facility  -     Hemoglobin A1c  -     Lipid panel reflex to direct LDL Non-fasting  -     Comprehensive metabolic panel (BMP + Alb, Alk Phos, ALT, AST, Total. Bili, TP)  -     CBC with platelets  -     Vitamin D deficiency screening    Autistic Disorder  Learning Disability - Expressive Language  Currently behavioral issues have generally been increased due to COVID pandemic/ OCD tendencies.   Going to see another provider regarding medical cannabis certification to see if this can help his sx.   He is completed the Defeat Autism Now (DAN!) protocol-continues with dairy free, gluten free, soy free diet and dietary supplements  -We reviewed side effect profile of the Risperdal and need for annual eye exam as well as laboratory monitoring which we will collect today.  - De-escalation measures: Needs VERBAL prompting with clear instructions; do not touch or this escalates behaviors  - Has additional respite services in place, declines need for therapy  -     risperiDONE (RISPERDAL) 1 MG tablet; TAKE 1 TABLET BY MOUTH IN THE MORNING AND 1 TABLET BY MOUTH IN THE EVENING. Ok to use an additional 0.5-1 tablet in the afternoon as needed.  -     hydrOXYzine (VISTARIL) 25 MG capsule; [HYDROXYZINE PAMOATE (VISTARIL) 25 MG CAPSULE] Take 1-2 pills by mouth at night to help with sleep onset.     Non-seasonal allergic rhinitis due to pollen  Continues on Flonase, Patanol eyedrops and Singulair.  Has gone  "through oral immunotherapy in [place].  Declines subQ IT.    Encounter for therapeutic drug monitoring  -     Hemoglobin A1c  -     Lipid panel reflex to direct LDL Non-fasting  -     Comprehensive metabolic panel (BMP + Alb, Alk Phos, ALT, AST, Total. Bili, TP)  -     CBC with platelets            Patient has been advised of split billing requirements and indicates understanding: Yes  COUNSELING:   Reviewed preventive health counseling, as reflected in patient instructions    Estimated body mass index is 24.26 kg/m  as calculated from the following:    Height as of this encounter: 1.797 m (5' 10.75\").    Weight as of this encounter: 78.3 kg (172 lb 11.2 oz).         He reports that he has never smoked. He has never used smokeless tobacco.      Counseling Resources:  ATP IV Guidelines  Pooled Cohorts Equation Calculator  FRAX Risk Assessment  ICSI Preventive Guidelines  Dietary Guidelines for Americans, 2010  USDA's MyPlate  ASA Prophylaxis  Lung CA Screening    Rhonda Dodge MD  [facility]  "

## 2021-11-08 LAB — DEPRECATED CALCIDIOL+CALCIFEROL SERPL-MC: 41 UG/L (ref 30–80)

## 2022-01-03 DIAGNOSIS — F84.0 ACTIVE AUTISTIC DISORDER: ICD-10-CM

## 2022-01-06 RX ORDER — RISPERIDONE 1 MG/1
TABLET ORAL
Qty: 270 TABLET | Refills: 3 | Status: SHIPPED | OUTPATIENT
Start: 2022-01-06 | End: 2023-03-21

## 2022-01-06 NOTE — TELEPHONE ENCOUNTER
"Last Written Prescription Date:  11/5/21  Last Fill Quantity: 110,  # refills: 1   Last office visit provider:  11/5/21     Requested Prescriptions   Pending Prescriptions Disp Refills     risperiDONE (RISPERDAL) 1 MG tablet [Pharmacy Med Name: RISPERIDONE 1MG TABLETS] 110 tablet 1     Sig: TAKE 1 TABLET BY MOUTH EVERY MORNING AND EVERY EVENING. MAY USE ADDITIONAL 1/2 TO 1 TABLET EVERY AFTERNOON AS NEEDED       Antipsychotic Medications Passed - 1/3/2022 10:30 AM        Passed - Blood pressure under 140/90 in past 12 months     BP Readings from Last 3 Encounters:   11/05/21 110/72   10/29/20 110/68   01/16/20 106/62                 Passed - Patient is 12 years of age or older        Passed - Lipid panel on file within the past 12 months     Recent Labs   Lab Test 11/05/21  1451   CHOL 146   TRIG 128   HDL 51   LDL 69               Passed - CBC on file in past 12 months     Recent Labs   Lab Test 11/05/21  1451   WBC 6.8   RBC 5.22   HGB 15.0   HCT 43.9                    Passed - Heart Rate on file within past 12 months     Pulse Readings from Last 3 Encounters:   11/05/21 84   10/29/20 96               Passed - A1c or Glucose on file in past 12 months     Recent Labs   Lab Test 11/05/21  1451   GLC 85   A1C 5.3       Please review patients last 3 weights. If a weight gain of >10 lbs exists, you may refill the prescription once after instructing the patient to schedule an appointment within the next 30 days.    Wt Readings from Last 3 Encounters:   11/05/21 78.3 kg (172 lb 11.2 oz)   10/29/20 76 kg (167 lb 8 oz)   01/16/20 79.5 kg (175 lb 4.8 oz)             Passed - Medication is active on med list        Passed - Recent (6 mo) or future (30 days) visit within the authorizing provider's specialty     Patient had office visit in the last 6 months or has a visit in the next 30 days with authorizing provider or within the authorizing provider's specialty.  See \"Patient Info\" tab in inbasket, or \"Choose Columns\" " in Meds & Orders section of the refill encounter.                 Evin Rausch RN 01/06/22 8:07 AM

## 2022-05-25 ENCOUNTER — TELEPHONE (OUTPATIENT)
Dept: FAMILY MEDICINE | Facility: CLINIC | Age: 27
End: 2022-05-25
Payer: COMMERCIAL

## 2022-05-25 DIAGNOSIS — F84.0 ACTIVE AUTISTIC DISORDER: Primary | ICD-10-CM

## 2022-05-25 NOTE — TELEPHONE ENCOUNTER
Mom dropped forms off to be completed by Dr. Dodge.    Please call mom at [phone] when done.    Routed to  core.

## 2022-05-26 ENCOUNTER — MEDICAL CORRESPONDENCE (OUTPATIENT)
Dept: HEALTH INFORMATION MANAGEMENT | Facility: CLINIC | Age: 27
End: 2022-05-26
Payer: COMMERCIAL

## 2022-05-26 RX ORDER — RISPERIDONE 1 MG/1
1 TABLET ORAL DAILY PRN
Qty: 30 TABLET | Refills: 4 | Status: SHIPPED | OUTPATIENT
Start: 2022-05-26 | End: 2024-04-17

## 2022-05-26 NOTE — TELEPHONE ENCOUNTER
Forms filled out with assistance appreciated from [employer]; will also send the prn 1mg risperdal which was requested by mom for prn in situations of agitation/agression should something break or go missing (his triggers)

## 2022-09-29 ENCOUNTER — DOCUMENTATION ONLY (OUTPATIENT)
Dept: OTHER | Facility: CLINIC | Age: 27
End: 2022-09-29

## 2022-11-19 ENCOUNTER — HEALTH MAINTENANCE LETTER (OUTPATIENT)
Age: 27
End: 2022-11-19

## 2022-12-13 DIAGNOSIS — H57.9 ALLERGIC EYE REACTION: ICD-10-CM

## 2022-12-13 DIAGNOSIS — Z91.09 ENVIRONMENTAL ALLERGIES: ICD-10-CM

## 2022-12-14 RX ORDER — OLOPATADINE HYDROCHLORIDE 1 MG/ML
SOLUTION/ DROPS OPHTHALMIC
Qty: 5 ML | Refills: 0 | Status: SHIPPED | OUTPATIENT
Start: 2022-12-14 | End: 2023-06-03

## 2022-12-14 RX ORDER — MONTELUKAST SODIUM 10 MG/1
10 TABLET ORAL AT BEDTIME
Qty: 90 TABLET | Refills: 0 | Status: SHIPPED | OUTPATIENT
Start: 2022-12-14 | End: 2023-03-21

## 2022-12-14 NOTE — TELEPHONE ENCOUNTER
"Routing refill request to provider for review/approval because:  Patient needs to be seen because it has been more than 1 year since last office visit.    Last Written Prescription Date:  11/5/21  Last Fill Quantity: 5 ml,  # refills: 6   Last office visit provider:  11/5/21     Last Written Prescription Date:  11/5/21  Last Fill Quantity: 90,  # refills: 3   Last office visit provider:  11/5/21    Requested Prescriptions   Pending Prescriptions Disp Refills     olopatadine (PATANOL) 0.1 % ophthalmic solution [Pharmacy Med Name: OLOPATADINE 0.1% OPTH SOLN 5ML] 5 mL 6     Sig: INSTILL 1 DROP IN BOTH EYES TWICE DAILY       Miscellaneous Opthalmic Allergy Drops Protocol Failed - 12/14/2022 10:35 AM        Failed - Recent (12 mo) or future (30 days) visit within the authorizing provider's specialty     Patient has had an office visit with the authorizing provider or a provider within the authorizing providers department within the previous 12 mos or has a future within next 30 days. See \"Patient Info\" tab in inbasket, or \"Choose Columns\" in Meds & Orders section of the refill encounter.              Passed - Patient is age 4 or older        Passed - Medication is active on med list           montelukast (SINGULAIR) 10 MG tablet [Pharmacy Med Name: MONTELUKAST 10MG TABLETS] 90 tablet 3     Sig: TAKE 1 TABLET BY MOUTH EVERY NIGHT AT BEDTIME       Leukotriene Inhibitors Protocol Failed - 12/13/2022 10:30 AM        Failed - Recent (12 mo) or future (30 days) visit within the authorizing provider's specialty     Patient has had an office visit with the authorizing provider or a provider within the authorizing providers department within the previous 12 mos or has a future within next 30 days. See \"Patient Info\" tab in inbasket, or \"Choose Columns\" in Meds & Orders section of the refill encounter.              Passed - Patient is age 12 or older     If patient is under 16, ok to refill using age based dosing.           Passed " - Medication is active on med list             Evin Rausch RN 12/14/22 10:35 AM

## 2022-12-18 ENCOUNTER — HEALTH MAINTENANCE LETTER (OUTPATIENT)
Age: 27
End: 2022-12-18

## 2023-03-20 DIAGNOSIS — F84.0 ACTIVE AUTISTIC DISORDER: ICD-10-CM

## 2023-03-20 DIAGNOSIS — Z91.09 ENVIRONMENTAL ALLERGIES: ICD-10-CM

## 2023-03-20 NOTE — TELEPHONE ENCOUNTER
"Routing refill request to provider for review/approval because:  Labs not current:    Patient needs to be seen because it has been more than 1 year since last office visit.    Last Written Prescription Date:  12/14/22  Last Fill Quantity: 90,  # refills: 0   Last office visit provider:  11/5/21     Requested Prescriptions   Pending Prescriptions Disp Refills     montelukast (SINGULAIR) 10 MG tablet [Pharmacy Med Name: MONTELUKAST 10MG TABLETS] 90 tablet 0     Sig: TAKE 1 TABLET(10 MG) BY MOUTH AT BEDTIME       Leukotriene Inhibitors Protocol Failed - 3/20/2023 11:47 AM        Failed - Recent (12 mo) or future (30 days) visit within the authorizing provider's specialty     Patient has had an office visit with the authorizing provider or a provider within the authorizing providers department within the previous 12 mos or has a future within next 30 days. See \"Patient Info\" tab in inbasket, or \"Choose Columns\" in Meds & Orders section of the refill encounter.              Passed - Patient is age 12 or older     If patient is under 16, ok to refill using age based dosing.           Passed - Medication is active on med list           risperiDONE (RISPERDAL) 1 MG tablet [Pharmacy Med Name: RISPERIDONE 1MG TABLETS] 270 tablet 3     Sig: TAKE 1 TABLET BY MOUTH EVERY MORNING AND EVERY EVENING. MAY USE ADDITIONAL ONE-HALF TO ONE TABLET EVERY AFTERNOON AS NEEDED       Antipsychotic Medications Failed - 3/20/2023 11:47 AM        Failed - Blood pressure under 140/90 in past 12 months     BP Readings from Last 3 Encounters:   11/05/21 110/72   10/29/20 110/68   01/16/20 106/62                 Failed - Lipid panel on file within the past 12 months     Recent Labs   Lab Test 11/05/21  1451   CHOL 146   TRIG 128   HDL 51   LDL 69               Failed - Heart Rate on file within past 12 months     Pulse Readings from Last 3 Encounters:   11/05/21 84   10/29/20 96               Failed - A1c or Glucose on file in past 12 months     " "Recent Labs   Lab Test 11/05/21  1451   GLC 85   A1C 5.3       Please review patients last 3 weights. If a weight gain of >10 lbs exists, you may refill the prescription once after instructing the patient to schedule an appointment within the next 30 days.    Wt Readings from Last 3 Encounters:   11/05/21 78.3 kg (172 lb 11.2 oz)   10/29/20 76 kg (167 lb 8 oz)   01/16/20 79.5 kg (175 lb 4.8 oz)             Passed - Medication is active on med list        Passed - Patient is 18 years of age or older        Passed - Recent (6 mo) or future (30 days) visit within the authorizing provider's specialty     Patient had office visit in the last 6 months or has a visit in the next 30 days with authorizing provider or within the authorizing provider's specialty.  See \"Patient Info\" tab in inbasket, or \"Choose Columns\" in Meds & Orders section of the refill encounter.                 Evelia Montanez RN 03/20/23 5:32 PM  "

## 2023-03-21 RX ORDER — MONTELUKAST SODIUM 10 MG/1
TABLET ORAL
Qty: 90 TABLET | Refills: 0 | Status: SHIPPED | OUTPATIENT
Start: 2023-03-21 | End: 2023-04-19

## 2023-03-21 RX ORDER — RISPERIDONE 1 MG/1
TABLET ORAL
Qty: 270 TABLET | Refills: 3 | Status: SHIPPED | OUTPATIENT
Start: 2023-03-21 | End: 2023-04-19

## 2023-04-19 ENCOUNTER — OFFICE VISIT (OUTPATIENT)
Dept: FAMILY MEDICINE | Facility: CLINIC | Age: 28
End: 2023-04-19
Payer: MEDICAID

## 2023-04-19 VITALS
SYSTOLIC BLOOD PRESSURE: 124 MMHG | RESPIRATION RATE: 14 BRPM | BODY MASS INDEX: 25.62 KG/M2 | TEMPERATURE: 97.8 F | HEIGHT: 71 IN | WEIGHT: 183 LBS | DIASTOLIC BLOOD PRESSURE: 83 MMHG | HEART RATE: 103 BPM | OXYGEN SATURATION: 95 %

## 2023-04-19 DIAGNOSIS — Z91.09 ENVIRONMENTAL ALLERGIES: ICD-10-CM

## 2023-04-19 DIAGNOSIS — Z00.00 ROUTINE GENERAL MEDICAL EXAMINATION AT A HEALTH CARE FACILITY: Primary | ICD-10-CM

## 2023-04-19 DIAGNOSIS — F84.0 ACTIVE AUTISTIC DISORDER: ICD-10-CM

## 2023-04-19 PROCEDURE — 99213 OFFICE O/P EST LOW 20 MIN: CPT | Mod: 25 | Performed by: NURSE PRACTITIONER

## 2023-04-19 PROCEDURE — 99395 PREV VISIT EST AGE 18-39: CPT | Performed by: NURSE PRACTITIONER

## 2023-04-19 RX ORDER — MONTELUKAST SODIUM 10 MG/1
1 TABLET ORAL AT BEDTIME
Qty: 90 TABLET | Refills: 3 | Status: SHIPPED | OUTPATIENT
Start: 2023-04-19 | End: 2024-04-17

## 2023-04-19 RX ORDER — RISPERIDONE 1 MG/1
TABLET ORAL
Qty: 270 TABLET | Refills: 3 | Status: SHIPPED | OUTPATIENT
Start: 2023-04-19 | End: 2024-04-17

## 2023-04-19 ASSESSMENT — ENCOUNTER SYMPTOMS
ARTHRALGIAS: 0
SHORTNESS OF BREATH: 0
PARESTHESIAS: 0
EYE PAIN: 0
FREQUENCY: 0
HEARTBURN: 0
SORE THROAT: 0
DYSURIA: 0
NAUSEA: 0
MYALGIAS: 0
HEMATOCHEZIA: 0
ABDOMINAL PAIN: 0
JOINT SWELLING: 0
FEVER: 0
NERVOUS/ANXIOUS: 0
CONSTIPATION: 0
DIARRHEA: 0
HEADACHES: 0
CHILLS: 0
PALPITATIONS: 0
HEMATURIA: 0
COUGH: 0
WEAKNESS: 0
DIZZINESS: 0

## 2023-04-19 NOTE — PROGRESS NOTES
SUBJECTIVE:   CC: Mike is an 27 year old who presents for preventative health visit.       11/5/2021     2:07 PM   Additional Questions   Roomed by Dolores PAULINO LPN   Accompanied by Mother   Patient has been advised of split billing requirements and indicates understanding: Yes  Healthy Habits:     Getting at least 3 servings of Calcium per day:  Yes    Bi-annual eye exam:  NO    Dental care twice a year:  Yes    Sleep apnea or symptoms of sleep apnea:  None    Diet:  Gluten-free/reduced    Frequency of exercise:  4-5 days/week    Duration of exercise:  15-30 minutes    Taking medications regularly:  Yes    PHQ-2 Total Score: 0    Additional concerns today:  No      Lives part time with mom and dad and lives mon tue, wed, and thurs morning in his own house and 24 hours staffing and two roommates.     Goes [facility] [employer] [facility] - outings and cooking, did have a job pre COVID. Previously worked [employer]     Good eater    No sensory integration concerns.     Exercises, like bikes and walks    risperidone 1mg in Am and 1mg in PM and can have 1/2 tab at 3pm -mom would like for this to be a standing order.  Patient does not have any anxiety at this time, no agitation  Doing well with this and medical marijuana 4 tablets a day  Indigo - 4 tabs/day -mom feels like both of these medications have made a huge difference in his mood and activity    Today's PHQ-2 Score:       4/19/2023     3:01 PM   PHQ-2 ( 1999 Pf[employer])   Q1: Little interest or pleasure in doing things 0   Q2: Feeling down, depressed or hopeless 0   PHQ-2 Score 0   Q1: Little interest or pleasure in doing things Not at all    Not at all   Q2: Feeling down, depressed or hopeless Not at all    Not at all   PHQ-2 Score 0    0           Social History     Tobacco Use     Smoking status: Never     Smokeless tobacco: Never   Vaping Use     Vaping status: Not on file   Substance Use Topics     Alcohol use: No             4/19/2023     3:01 PM  "  Alcohol Use   Prescreen: >3 drinks/day or >7 drinks/week? Not Applicable       Last PSA: No results found for: PSA    Reviewed orders with patient. Reviewed health maintenance and updated orders accordingly - Yes      Reviewed and updated as needed this visit by clinical staff   Tobacco  Allergies  Meds    Surg Hx  Fam Hx          Reviewed and updated as needed this visit by Provider        Surg Hx  Fam Hx             Review of Systems   Constitutional: Negative for chills and fever.   HENT: Negative for congestion, ear pain, hearing loss and sore throat.    Eyes: Negative for pain and visual disturbance.   Respiratory: Negative for cough and shortness of breath.    Cardiovascular: Negative for chest pain, palpitations and peripheral edema.   Gastrointestinal: Negative for abdominal pain, constipation, diarrhea, heartburn, hematochezia and nausea.   Genitourinary: Negative for dysuria, frequency, genital sores, hematuria and urgency.   Musculoskeletal: Negative for arthralgias, joint swelling and myalgias.   Skin: Negative for rash.   Neurological: Negative for dizziness, weakness, headaches and paresthesias.   Psychiatric/Behavioral: Negative for mood changes. The patient is not nervous/anxious.          OBJECTIVE:   /83   Pulse 103   Temp 97.8  F (36.6  C) (Oral)   Resp 14   Ht 1.803 m (5' 11\")   Wt 83 kg (183 lb)   SpO2 95%   BMI 25.52 kg/m      Physical Exam  GENERAL: healthy, alert and no distress  EYES: Eyes grossly normal to inspection, PERRL and conjunctivae and sclerae normal  HENT: ear canals and TM's normal, nose and mouth without ulcers or lesions  NECK: no adenopathy, no asymmetry, masses, or scars and thyroid normal to palpation  RESP: lungs clear to auscultation - no rales, rhonchi or wheezes  CV: regular rate and rhythm, normal S1 S2, no S3 or S4, no murmur, click or rub, no peripheral edema and peripheral pulses strong  ABDOMEN: soft, nontender, no hepatosplenomegaly, no masses " "and bowel sounds normal  MS: no gross musculoskeletal defects noted, no edema  SKIN: no suspicious lesions or rashes  NEURO: Normal strength and tone, mentation intact and speech normal  PSYCH: mentation appears normal, affect normal/bright        ASSESSMENT/PLAN:   Krishna was seen today for recheck medication, physical and refill request.    Diagnoses and all orders for this visit:    Routine general medical examination at a health care facility  We discussed healthy lifestyle, nutrition, cardiovascular risk reduction, self care, safety, sunscreen, and timing of cancer screening.  Health maintenance screening and immunizations reviewed with the patient.  Follow up yearly for the annual physical.    Active autistic disorder  -     risperiDONE (RISPERDAL) 1 MG tablet; TAKE 1 TABLET BY MOUTH EVERY MORNING, EVENING, and ONE-HALF TABLET EVERY AFTERNOON at 3pm  Changing medication to be a standing order for half tablet in the afternoon at 3 PM as mom finds that this is helpful for the patient on days when he is not at the day center.  Day center does not typically get this to him since it is written as as needed and patient unable to request this himself    Continue medical cannabis  Physical exam form signed for camp this summer  Environmental allergies  -     montelukast (SINGULAIR) 10 MG tablet; Take 1 tablet (10 mg) by mouth At Bedtime  Continue Singulair during allergy season daily  Refilling  Stable with using Flonase as needed over-the-counter    Other orders  -     REVIEW OF HEALTH MAINTENANCE PROTOCOL ORDERS              COUNSELING:   Reviewed preventive health counseling, as reflected in patient instructions       Regular exercise       Healthy diet/nutrition      BMI:   Estimated body mass index is 25.52 kg/m  as calculated from the following:    Height as of this encounter: 1.803 m (5' 11\").    Weight as of this encounter: 83 kg (183 lb).   Weight management plan: Patient was referred to their PCP to discuss " a diet and exercise plan.      He reports that he has never smoked. He has never used smokeless tobacco.            JADEN Adame CNP [facility]

## 2023-06-03 ENCOUNTER — MYC REFILL (OUTPATIENT)
Dept: FAMILY MEDICINE | Facility: CLINIC | Age: 28
End: 2023-06-03
Payer: MEDICAID

## 2023-06-03 DIAGNOSIS — H57.9 ALLERGIC EYE REACTION: ICD-10-CM

## 2023-06-04 RX ORDER — OLOPATADINE HYDROCHLORIDE 1 MG/ML
SOLUTION/ DROPS OPHTHALMIC
Qty: 5 ML | Refills: 0 | Status: SHIPPED | OUTPATIENT
Start: 2023-06-04 | End: 2023-07-09

## 2023-06-04 NOTE — TELEPHONE ENCOUNTER
"Last Written Prescription Date:  12/14/22  Last Fill Quantity: 5 mL,  # refills: 0   Last office visit provider:  4/19/23     Requested Prescriptions   Pending Prescriptions Disp Refills     olopatadine (PATANOL) 0.1 % ophthalmic solution 5 mL 0     Sig: INSTILL 1 DROP IN BOTH EYES TWICE DAILY       Miscellaneous Opthalmic Allergy Drops Protocol Passed - 6/3/2023  9:34 PM        Passed - Patient is age 4 or older        Passed - Recent (12 mo) or future (30 days) visit within the authorizing provider's specialty     Patient has had an office visit with the authorizing provider or a provider within the authorizing providers department within the previous 12 mos or has a future within next 30 days. See \"Patient Info\" tab in inbasket, or \"Choose Columns\" in Meds & Orders section of the refill encounter.              Passed - Medication is active on med list             Tali Corbin 06/04/23 8:34 AM  "

## 2023-07-09 ENCOUNTER — MYC REFILL (OUTPATIENT)
Dept: FAMILY MEDICINE | Facility: CLINIC | Age: 28
End: 2023-07-09
Payer: MEDICAID

## 2023-07-09 DIAGNOSIS — H57.9 ALLERGIC EYE REACTION: ICD-10-CM

## 2023-07-09 RX ORDER — OLOPATADINE HYDROCHLORIDE 1 MG/ML
SOLUTION/ DROPS OPHTHALMIC
Qty: 5 ML | Refills: 0 | Status: SHIPPED | OUTPATIENT
Start: 2023-07-09 | End: 2024-04-17

## 2023-07-10 NOTE — TELEPHONE ENCOUNTER
"Last Written Prescription Date:  6/4/2023  Last Fill Quantity: 5ml,  # refills: 0   Last office visit provider:  4/19/2023     Requested Prescriptions   Pending Prescriptions Disp Refills     olopatadine (PATANOL) 0.1 % ophthalmic solution 5 mL 0     Sig: INSTILL 1 DROP IN BOTH EYES TWICE DAILY       Miscellaneous Opthalmic Allergy Drops Protocol Passed - 7/9/2023  8:14 PM        Passed - Patient is age 4 or older        Passed - Recent (12 mo) or future (30 days) visit within the authorizing provider's specialty     Patient has had an office visit with the authorizing provider or a provider within the authorizing providers department within the previous 12 mos or has a future within next 30 days. See \"Patient Info\" tab in inbasket, or \"Choose Columns\" in Meds & Orders section of the refill encounter.              Passed - Medication is active on med list             Rhonda Ni RN 07/09/23 10:11 PM  "

## 2023-07-13 ENCOUNTER — TELEPHONE (OUTPATIENT)
Dept: FAMILY MEDICINE | Facility: CLINIC | Age: 28
End: 2023-07-13
Payer: MEDICAID

## 2023-10-29 ENCOUNTER — DOCUMENTATION ONLY (OUTPATIENT)
Dept: OTHER | Facility: CLINIC | Age: 28
End: 2023-10-29
Payer: MEDICAID

## 2024-02-12 DIAGNOSIS — F84.0 ACTIVE AUTISTIC DISORDER: ICD-10-CM

## 2024-02-15 ENCOUNTER — MYC REFILL (OUTPATIENT)
Dept: FAMILY MEDICINE | Facility: CLINIC | Age: 29
End: 2024-02-15
Payer: MEDICAID

## 2024-02-15 DIAGNOSIS — F84.0 ACTIVE AUTISTIC DISORDER: ICD-10-CM

## 2024-02-15 RX ORDER — RISPERIDONE 1 MG/1
TABLET ORAL
Qty: 270 TABLET | Refills: 3 | OUTPATIENT
Start: 2024-02-15

## 2024-02-15 RX ORDER — RISPERIDONE 1 MG/1
TABLET ORAL
Qty: 270 TABLET | Refills: 3 | Status: SHIPPED | OUTPATIENT
Start: 2024-02-15

## 2024-04-12 SDOH — HEALTH STABILITY: PHYSICAL HEALTH: ON AVERAGE, HOW MANY DAYS PER WEEK DO YOU ENGAGE IN MODERATE TO STRENUOUS EXERCISE (LIKE A BRISK WALK)?: 5 DAYS

## 2024-04-12 SDOH — HEALTH STABILITY: PHYSICAL HEALTH: ON AVERAGE, HOW MANY MINUTES DO YOU ENGAGE IN EXERCISE AT THIS LEVEL?: 30 MIN

## 2024-04-12 ASSESSMENT — SOCIAL DETERMINANTS OF HEALTH (SDOH): HOW OFTEN DO YOU GET TOGETHER WITH FRIENDS OR RELATIVES?: MORE THAN THREE TIMES A WEEK

## 2024-04-17 ENCOUNTER — OFFICE VISIT (OUTPATIENT)
Dept: FAMILY MEDICINE | Facility: CLINIC | Age: 29
End: 2024-04-17
Payer: MEDICAID

## 2024-04-17 VITALS
HEIGHT: 71 IN | OXYGEN SATURATION: 97 % | RESPIRATION RATE: 18 BRPM | TEMPERATURE: 97.8 F | BODY MASS INDEX: 25.28 KG/M2 | SYSTOLIC BLOOD PRESSURE: 106 MMHG | WEIGHT: 180.6 LBS | DIASTOLIC BLOOD PRESSURE: 72 MMHG | HEART RATE: 83 BPM

## 2024-04-17 DIAGNOSIS — Z00.00 ROUTINE GENERAL MEDICAL EXAMINATION AT A HEALTH CARE FACILITY: Primary | ICD-10-CM

## 2024-04-17 DIAGNOSIS — L91.8 SKIN TAG: ICD-10-CM

## 2024-04-17 DIAGNOSIS — F84.0 ACTIVE AUTISTIC DISORDER: ICD-10-CM

## 2024-04-17 DIAGNOSIS — H57.9 ALLERGIC EYE REACTION: ICD-10-CM

## 2024-04-17 DIAGNOSIS — Z91.09 ENVIRONMENTAL ALLERGIES: ICD-10-CM

## 2024-04-17 LAB — HBA1C MFR BLD: 5.4 % (ref 0–5.6)

## 2024-04-17 PROCEDURE — 83036 HEMOGLOBIN GLYCOSYLATED A1C: CPT | Performed by: NURSE PRACTITIONER

## 2024-04-17 PROCEDURE — 80061 LIPID PANEL: CPT | Performed by: NURSE PRACTITIONER

## 2024-04-17 PROCEDURE — 11200 RMVL SKIN TAGS UP TO&INC 15: CPT | Performed by: NURSE PRACTITIONER

## 2024-04-17 PROCEDURE — 36415 COLL VENOUS BLD VENIPUNCTURE: CPT | Performed by: NURSE PRACTITIONER

## 2024-04-17 PROCEDURE — 99214 OFFICE O/P EST MOD 30 MIN: CPT | Mod: 25 | Performed by: NURSE PRACTITIONER

## 2024-04-17 PROCEDURE — 80048 BASIC METABOLIC PNL TOTAL CA: CPT | Performed by: NURSE PRACTITIONER

## 2024-04-17 PROCEDURE — 99395 PREV VISIT EST AGE 18-39: CPT | Performed by: NURSE PRACTITIONER

## 2024-04-17 RX ORDER — AZELASTINE 1 MG/ML
1 SPRAY, METERED NASAL 2 TIMES DAILY
Qty: 30 ML | Refills: 0 | Status: SHIPPED | OUTPATIENT
Start: 2024-04-17

## 2024-04-17 RX ORDER — OLOPATADINE HYDROCHLORIDE 1 MG/ML
SOLUTION/ DROPS OPHTHALMIC
Qty: 5 ML | Refills: 0 | Status: SHIPPED | OUTPATIENT
Start: 2024-04-17

## 2024-04-17 RX ORDER — RISPERIDONE 1 MG/1
TABLET ORAL
Qty: 270 TABLET | Refills: 3 | Status: SHIPPED | OUTPATIENT
Start: 2024-04-17

## 2024-04-17 RX ORDER — MONTELUKAST SODIUM 10 MG/1
1 TABLET ORAL AT BEDTIME
Qty: 90 TABLET | Refills: 3 | Status: SHIPPED | OUTPATIENT
Start: 2024-04-17

## 2024-04-17 NOTE — PATIENT INSTRUCTIONS
Preventive Care Advice   This is general advice given by our system to help you stay healthy. However, your care team may have specific advice just for you. Please talk to your care team about your preventive care needs.  Nutrition  Eat 5 or more servings of fruits and vegetables each day.  Try wheat bread, brown rice and whole grain pasta (instead of white bread, rice, and pasta).  Get enough calcium and vitamin D. Check the label on foods and aim for 100% of the RDA (recommended daily allowance).  Lifestyle  Exercise at least 150 minutes each week   (30 minutes a day, 5 days a week).  Do muscle strengthening activities 2 days a week. These help control your weight and prevent disease.  No smoking.  Wear sunscreen to prevent skin cancer.  Have a dental exam and cleaning every 6 months.  Yearly exams  See your health care team every year to talk about:  Any changes in your health.  Any medicines your care team has prescribed.  Preventive care, family planning, and ways to prevent chronic diseases.  Shots (vaccines)   HPV shots (up to age 26), if you've never had them before.  Hepatitis B shots (up to age 59), if you've never had them before.  COVID-19 shot: Get this shot when it's due.  Flu shot: Get a flu shot every year.  Tetanus shot: Get a tetanus shot every 10 years.  Pneumococcal, hepatitis A, and RSV shots: Ask your care team if you need these based on your risk.  Shingles shot (for age 50 and up).  General health tests  Diabetes screening:  Starting at age 35, Get screened for diabetes at least every 3 years.  If you are younger than age 35, ask your care team if you should be screened for diabetes.  Cholesterol test: At age 39, start having a cholesterol test every 5 years, or more often if advised.  Bone density scan (DEXA): At age 50, ask your care team if you should have this scan for osteoporosis (brittle bones).  Hepatitis C: Get tested at least once in your life.  STIs (sexually transmitted  infections)  Before age 24: Ask your care team if you should be screened for STIs.  After age 24: Get screened for STIs if you're at risk. You are at risk for STIs (including HIV) if:  You are sexually active with more than one person.  You don't use condoms every time.  You or a partner was diagnosed with a sexually transmitted infection.  If you are at risk for HIV, ask about PrEP medicine to prevent HIV.  Get tested for HIV at least once in your life, whether you are at risk for HIV or not.  Cancer screening tests  Cervical cancer screening: If you have a cervix, begin getting regular cervical cancer screening tests at age 21. Most people who have regular screenings with normal results can stop after age 65. Talk about this with your provider.  Breast cancer scan (mammogram): If you've ever had breasts, begin having regular mammograms starting at age 40. This is a scan to check for breast cancer.  Colon cancer screening: It is important to start screening for colon cancer at age 45.  Have a colonoscopy test every 10 years (or more often if you're at risk) Or, ask your provider about stool tests like a FIT test every year or Cologuard test every 3 years.  To learn more about your testing options, visit: https://www.P2P-Next/914521.pdf.  For help making a decision, visit: https://bit.ly/rb51336.  Prostate cancer screening test: If you have a prostate and are age 55 to 69, ask your provider if you would benefit from a yearly prostate cancer screening test.  Lung cancer screening: If you are a current or former smoker age 50 to 80, ask your care team if ongoing lung cancer screenings are right for you.  For informational purposes only. Not to replace the advice of your health care provider. Copyright   2023 [employer]. All rights reserved. Clinically reviewed by the [facility]. [employer] 329987 - REV 01/24.    Learning About Stress  What is stress?     Stress is your  body's response to a hard situation. Your body can have a physical, emotional, or mental response. Stress is a fact of life for most people, and it affects everyone differently. What causes stress for you may not be stressful for someone else.  A lot of things can cause stress. You may feel stress when you go on a job interview, take a test, or run a race. This kind of short-term stress is normal and even useful. It can help you if you need to work hard or react quickly. For example, stress can help you finish an important job on time.  Long-term stress is caused by ongoing stressful situations or events. Examples of long-term stress include long-term health problems, ongoing problems at work, or conflicts in your family. Long-term stress can harm your health.  How does stress affect your health?  When you are stressed, your body responds as though you are in danger. It makes hormones that speed up your heart, make you breathe faster, and give you a burst of energy. This is called the fight-or-flight stress response. If the stress is over quickly, your body goes back to normal and no harm is done.  But if stress happens too often or lasts too long, it can have bad effects. Long-term stress can make you more likely to get sick, and it can make symptoms of some diseases worse. If you tense up when you are stressed, you may develop neck, shoulder, or low back pain. Stress is linked to high blood pressure and heart disease.  Stress also harms your emotional health. It can make you jimenez, tense, or depressed. Your relationships may suffer, and you may not do well at work or school.  What can you do to manage stress?  You can try these things to help manage stress:   Do something active. Exercise or activity can help reduce stress. Walking is a great way to get started. Even everyday activities such as housecleaning or yard work can help.  Try yoga or madonna chi. These techniques combine exercise and meditation. You may need  some training at first to learn them.  Do something you enjoy. For example, listen to music or go to a movie. Practice your hobby or do volunteer work.  Meditate. This can help you relax, because you are not worrying about what happened before or what may happen in the future.  Do guided imagery. Imagine yourself in any setting that helps you feel calm. You can use online videos, books, or a teacher to guide you.  Do breathing exercises. For example:  From a standing position, bend forward from the waist with your knees slightly bent. Let your arms dangle close to the floor.  Breathe in slowly and deeply as you return to a standing position. Roll up slowly and lift your head last.  Hold your breath for just a few seconds in the standing position.  Breathe out slowly and bend forward from the waist.  Let your feelings out. Talk, laugh, cry, and express anger when you need to. Talking with supportive friends or family, a counselor, or a kael leader about your feelings is a healthy way to relieve stress. Avoid discussing your feelings with people who make you feel worse.  Write. It may help to write about things that are bothering you. This helps you find out how much stress you feel and what is causing it. When you know this, you can find better ways to cope.  What can you do to prevent stress?  You might try some of these things to help prevent stress:  Manage your time. This helps you find time to do the things you want and need to do.  Get enough sleep. Your body recovers from the stresses of the day while you are sleeping.  Get support. Your family, friends, and community can make a difference in how you experience stress.  Limit your news feed. Avoid or limit time on social media or news that may make you feel stressed.  Do something active. Exercise or activity can help reduce stress. Walking is a great way to get started.  Where can you learn more?  Go to https://www.healthwise.net/patiented  Enter N032 in the  "search box to learn more about \"Learning About Stress.\"  Current as of: October 24, 2023               Content Version: 14.0    5448-5669 [employer].   Care instructions adapted under license by your healthcare professional. If you have questions about a medical condition or this instruction, always ask your healthcare professional. [employer] disclaims any warranty or liability for your use of this information.      "

## 2024-04-17 NOTE — PROGRESS NOTES
Preventive Care Visit  [facility]  JADEN Adame CNP, Family Medicine  Apr 17, 2024      Assessment & Plan     Routine general medical examination at a health care facility  We discussed healthy lifestyle, nutrition, cardiovascular risk reduction, self care, safety, sunscreen, and timing of cancer screening.  Health maintenance screening and immunizations reviewed with the patient.  Follow up yearly for the annual physical.    Form completed for home care - continue at adult home with caregiver during day time hours    Form completed for clearance at [facility] this summer    Environmental allergies  Discussed using azelastine first for congestion secondary to seasonal allergies - may use flonase if no improvement of congestion   Continue with home filter  Monteleukast daily during allergy season  - montelukast (SINGULAIR) 10 MG tablet  Dispense: 90 tablet; Refill: 3  - azelastine (ASTELIN) 0.1 % nasal spray  Dispense: 30 mL; Refill: 0    Allergic eye reaction  Continue PRN use  - olopatadine (PATANOL) 0.1 % ophthalmic solution  Dispense: 5 mL; Refill: 0    Active autistic disorder  On Risperidone 1mg TID - doing well with this - check A1c and Lipids given risk of metabolic disease related to this.   - risperiDONE (RISPERDAL) 1 MG tablet  Dispense: 270 tablet; Refill: 3  - Lipid Profile (Chol, Trig, HDL, LDL calc)  - Hemoglobin A1c  - Basic metabolic panel  (Ca, Cl, CO2, Creat, Gluc, K, Na, BUN)  - Lipid Profile (Chol, Trig, HDL, LDL calc)  - Hemoglobin A1c  - Basic metabolic panel  (Ca, Cl, CO2, Creat, Gluc, K, Na, BUN)    Skin tag  Patient with pulling and irritation of left Axillary skin tag  This was removed today without complication  Discussed s/sx of infection and when to call clinic.   - REMOVAL OF SKIN TAGS, FIRST 15      Patient has been advised of split billing requirements and indicates understanding: Yes          BMI  Estimated body mass index is 25.19 kg/m  as calculated  "from the following:    Height as of this encounter: 1.803 m (5' 11\").    Weight as of this encounter: 81.9 kg (180 lb 9.6 oz).   Weight management plan: Discussed healthy diet and exercise guidelines    Counseling  Appropriate preventive services were discussed with this patient, including applicable screening as appropriate for fall prevention, nutrition, physical activity, Tobacco-use cessation, weight loss and cognition.  Checklist reviewing preventive services available has been given to the patient.  Reviewed patient's diet, addressing concerns and/or questions.   He is at risk for psychosocial distress and has been provided with information to reduce risk.           Elizabeth Mckeon is a 28 year old, presenting for the following:  Physical (Physical, skin tag on left arm and med check)        4/17/2024    10:27 AM   Additional Questions   Roomed by JAYDEN-LPN   Accompanied by MOSES Simpson is caregiver - goes part time with a group home - [employer] is staffing agency.  Lives in his own house and 24 hours staffing and two roommates with ASD - no nighttime concerns or sleep concerns.=.      Goes to [facility] day program - outings and cooking, did have a job pre COVID. Previously worked [employer]       [email or web address]      Good eater - eating healthy foods, goes out for walks, social activities, special olympics (bowling, bocce ball)     No sensory integration concerns.      Exercises, like bikes and walks     risperidone 1mg in Am and 1mg in PM and can have 1 tab at 3pm -  Patient does not have any anxiety at this time, no agitation  Doing well with this and medical marijuana 4 tablets a day  Indigo - 4 tabs/day -mom feels like both of these medications have made a huge difference in his mood and activity    Has a skin tag on left axillary that is irritating, picking this, feels self conscious when swimming    Health Care Directive  Patient has a Health Care Directive on file  Discussed advance care planning " with patient.    Healthy Habits:     Getting at least 3 servings of Calcium per day:  Yes    Bi-annual eye exam:  NO    Dental care twice a year:  Yes    Sleep apnea or symptoms of sleep apnea:  None    Diet:  Gluten-free/reduced    Frequency of exercise:  4-5 days/week    Duration of exercise:  30-45 minutes    Taking medications regularly:  Yes    Barriers to taking medications:  None    Medication side effects:  None    Additional concerns today:  Yes                4/12/2024   General Health   How would you rate your overall physical health? Excellent   Feel stress (tense, anxious, or unable to sleep) Only a little   (!) STRESS CONCERN      4/12/2024   Nutrition   Three or more servings of calcium each day? Yes   Diet: Gluten-free/reduced   How many servings of fruit and vegetables per day? (!) 2-3   How many sweetened beverages each day? 0-1         4/12/2024   Exercise   Days per week of moderate/strenous exercise 5 days   Average minutes spent exercising at this level 30 min         4/12/2024   Social Factors   Frequency of gathering with friends or relatives More than three times a week   Worry food won't last until get money to buy more No   Food not last or not have enough money for food? No   Do you have housing?  Yes   Are you worried about losing your housing? No   Lack of transportation? No   Unable to get utilities (heat,electricity)? No         4/12/2024   Dental   Dentist two times every year? Yes         4/12/2024   TB Screening   Were you born outside of the [place]? No         Today's PHQ-2 Score:       4/17/2024     9:59 AM   PHQ-2 ( 1999 Pfizer)   Q1: Little interest or pleasure in doing things 0   Q2: Feeling down, depressed or hopeless 0   PHQ-2 Score 0   Q1: Little interest or pleasure in doing things Not at all   Q2: Feeling down, depressed or hopeless Not at all   PHQ-2 Score 0           4/12/2024   Substance Use   Alcohol more than 3/day or more than 7/wk No   Do you use any other substances  "recreationally? No     Social History     Tobacco Use    Smoking status: Never    Smokeless tobacco: Never   Substance Use Topics    Alcohol use: No    Drug use: No             4/12/2024   One time HIV Screening   Previous HIV test? No         4/12/2024   STI Screening   New sexual partner(s) since last STI/HIV test? No         4/12/2024   Contraception/Family Planning   Questions about contraception or family planning No        Reviewed and updated as needed this visit by Provider   Tobacco  Allergies  Meds  Problems  Med Hx  Surg Hx  Fam Hx                  Review of Systems  Constitutional, neuro, ENT, endocrine, pulmonary, cardiac, gastrointestinal, genitourinary, musculoskeletal, integument and psychiatric systems are negative, except as otherwise noted.     Objective    Exam  /72   Pulse 83   Temp 97.8  F (36.6  C) (Oral)   Resp 18   Ht 1.803 m (5' 11\")   Wt 81.9 kg (180 lb 9.6 oz)   SpO2 97%   BMI 25.19 kg/m     Estimated body mass index is 25.19 kg/m  as calculated from the following:    Height as of this encounter: 1.803 m (5' 11\").    Weight as of this encounter: 81.9 kg (180 lb 9.6 oz).    Physical Exam  GENERAL: alert and no distress  EYES: Eyes grossly normal to inspection, PERRL and conjunctivae and sclerae normal  HENT: ear canals and TM's normal, nose and mouth without ulcers or lesions  NECK: no adenopathy, no asymmetry, masses, or scars  RESP: lungs clear to auscultation - no rales, rhonchi or wheezes  CV: regular rate and rhythm, normal S1 S2, no S3 or S4, no murmur, click or rub, no peripheral edema  ABDOMEN: soft, nontender, no hepatosplenomegaly, no masses and bowel sounds normal  MS: no gross musculoskeletal defects noted, no edema  SKIN: left axillary - large skin tag >1cm in size  NEURO: Normal strength and tone, mentation intact and speech normal  PSYCH: mentation appears normal, affect normal/bright    Procedure:  skin tag- one large under L axillar area - Cleaned, " anesthetized with 1% lidocaine with epinephrine, shaved with #15 blade, bacitracin and gauze applied with bandaid - extras given.  Pt tolerated procedure well.      Signed Electronically by: JADEN Adame CNP

## 2024-04-18 LAB
ANION GAP SERPL CALCULATED.3IONS-SCNC: 11 MMOL/L (ref 7–15)
BUN SERPL-MCNC: 20.8 MG/DL (ref 6–20)
CALCIUM SERPL-MCNC: 9.7 MG/DL (ref 8.6–10)
CHLORIDE SERPL-SCNC: 102 MMOL/L (ref 98–107)
CHOLEST SERPL-MCNC: 134 MG/DL
CREAT SERPL-MCNC: 0.82 MG/DL (ref 0.67–1.17)
DEPRECATED HCO3 PLAS-SCNC: 24 MMOL/L (ref 22–29)
EGFRCR SERPLBLD CKD-EPI 2021: >90 ML/MIN/1.73M2
FASTING STATUS PATIENT QL REPORTED: YES
GLUCOSE SERPL-MCNC: 88 MG/DL (ref 70–99)
HDLC SERPL-MCNC: 44 MG/DL
LDLC SERPL CALC-MCNC: 79 MG/DL
NONHDLC SERPL-MCNC: 90 MG/DL
POTASSIUM SERPL-SCNC: 4.6 MMOL/L (ref 3.4–5.3)
SODIUM SERPL-SCNC: 137 MMOL/L (ref 135–145)
TRIGL SERPL-MCNC: 57 MG/DL

## 2024-04-22 NOTE — RESULT ENCOUNTER NOTE
Your basic metabolic panel shows your kidney function and electrolytes (sodium and potassium) were normal. UN is slightly high, this usually is linked with some dehydration and not significant in a nd f itself.     Your cholesterol testing is mildly elevated though you are still at very low risk for a cardiovascular event. This is good news and means we do not need to consider a statin medication at this time. Cholesterol levels can be maintained with lifestyle modifications to lower cardiovascular disease risk. This includes eating a heart-healthy plant forward diet with more emphasis on vegetables, fruits & whole grains, regular aerobic exercises (30min-1hr daily), maintenance of desirable body weight and avoidance of tobacco products.      No diabetes - ok to continue risperidone.     Please let me know if you have any questions or concerns.    Thank you for trusting us with your care. It was a pleasure seeing you.  JADEN Adame CNP on 4/22/2024 at 1:41 PM

## 2024-06-18 ENCOUNTER — TELEPHONE (OUTPATIENT)
Dept: FAMILY MEDICINE | Facility: CLINIC | Age: 29
End: 2024-06-18
Payer: MEDICAID

## 2024-06-18 NOTE — TELEPHONE ENCOUNTER
Forms/Letter Request    Type of form/letter: OTHER: physical exam form for [employer]       Do we have the form/letter: Yes in workroom behind     Who is the form from? [employer] (if other please explain)    Where did/will the form come from? Patient or family brought in       When is form/letter needed by: ASAP--first meet is M 6/24    How would you like the form/letter returned:     Patient Notified form requests are processed in 5-7 business days:Yes    Could we send this information to you in [email or web address] or would you prefer to receive a phone call?:   No preference   Okay to leave a detailed message?: Yes at Other phone number:  [phone] Kimberly Morel

## 2024-06-24 NOTE — TELEPHONE ENCOUNTER
Form completed and given to . Provider called guardian and informed them they may  at . Copy placed into HIM bin to be scanned into medical record.     Cleared to perform in [employer].

## 2024-10-08 DIAGNOSIS — Z91.09 ENVIRONMENTAL ALLERGIES: ICD-10-CM

## 2024-10-08 RX ORDER — AZELASTINE 1 MG/ML
1 SPRAY, METERED NASAL 2 TIMES DAILY
Qty: 30 ML | Refills: 0 | Status: SHIPPED | OUTPATIENT
Start: 2024-10-08

## 2025-03-18 ENCOUNTER — PATIENT OUTREACH (OUTPATIENT)
Dept: CARE COORDINATION | Facility: CLINIC | Age: 30
End: 2025-03-18
Payer: MEDICAID

## 2025-04-30 ENCOUNTER — OFFICE VISIT (OUTPATIENT)
Dept: FAMILY MEDICINE | Facility: CLINIC | Age: 30
End: 2025-04-30
Payer: MEDICAID

## 2025-04-30 VITALS
RESPIRATION RATE: 20 BRPM | TEMPERATURE: 98.6 F | DIASTOLIC BLOOD PRESSURE: 60 MMHG | BODY MASS INDEX: 25.55 KG/M2 | HEIGHT: 71 IN | WEIGHT: 182.5 LBS | OXYGEN SATURATION: 97 % | SYSTOLIC BLOOD PRESSURE: 120 MMHG | HEART RATE: 90 BPM

## 2025-04-30 DIAGNOSIS — Z91.09 ENVIRONMENTAL ALLERGIES: ICD-10-CM

## 2025-04-30 DIAGNOSIS — Z00.00 ROUTINE GENERAL MEDICAL EXAMINATION AT A HEALTH CARE FACILITY: Primary | ICD-10-CM

## 2025-04-30 DIAGNOSIS — H57.9 ALLERGIC EYE REACTION: ICD-10-CM

## 2025-04-30 DIAGNOSIS — F84.0 ACTIVE AUTISTIC DISORDER: ICD-10-CM

## 2025-04-30 PROCEDURE — 3074F SYST BP LT 130 MM HG: CPT | Performed by: NURSE PRACTITIONER

## 2025-04-30 PROCEDURE — 3078F DIAST BP <80 MM HG: CPT | Performed by: NURSE PRACTITIONER

## 2025-04-30 PROCEDURE — 99395 PREV VISIT EST AGE 18-39: CPT | Performed by: NURSE PRACTITIONER

## 2025-04-30 RX ORDER — RISPERIDONE 1 MG/1
TABLET ORAL
Qty: 270 TABLET | Refills: 3 | Status: SHIPPED | OUTPATIENT
Start: 2025-04-30

## 2025-04-30 RX ORDER — AZELASTINE 1 MG/ML
1 SPRAY, METERED NASAL 2 TIMES DAILY
Qty: 30 ML | Refills: 4 | Status: SHIPPED | OUTPATIENT
Start: 2025-04-30

## 2025-04-30 RX ORDER — MONTELUKAST SODIUM 10 MG/1
1 TABLET ORAL AT BEDTIME
Qty: 90 TABLET | Refills: 3 | Status: SHIPPED | OUTPATIENT
Start: 2025-04-30

## 2025-04-30 RX ORDER — OLOPATADINE HYDROCHLORIDE 1 MG/ML
SOLUTION/ DROPS OPHTHALMIC
Qty: 5 ML | Refills: 0 | Status: SHIPPED | OUTPATIENT
Start: 2025-04-30 | End: 2025-04-30

## 2025-04-30 RX ORDER — OLOPATADINE HYDROCHLORIDE 1 MG/ML
SOLUTION/ DROPS OPHTHALMIC
Qty: 5 ML | Refills: 3 | Status: SHIPPED | OUTPATIENT
Start: 2025-04-30

## 2025-04-30 SDOH — HEALTH STABILITY: PHYSICAL HEALTH: ON AVERAGE, HOW MANY DAYS PER WEEK DO YOU ENGAGE IN MODERATE TO STRENUOUS EXERCISE (LIKE A BRISK WALK)?: 5 DAYS

## 2025-04-30 ASSESSMENT — SOCIAL DETERMINANTS OF HEALTH (SDOH): HOW OFTEN DO YOU GET TOGETHER WITH FRIENDS OR RELATIVES?: MORE THAN THREE TIMES A WEEK

## 2025-04-30 NOTE — PROGRESS NOTES
"Preventive Care Visit  [facility]  Carol BynumsachabaltaJADEN CNP, Family Medicine  Apr 30, 2025      Assessment & Plan     Routine general medical examination at a health care facility  We discussed healthy lifestyle, nutrition, cardiovascular risk reduction, self care, safety, sunscreen, and timing of cancer screening.  Health maintenance screening and immunizations reviewed with the patient.  Follow up yearly for the annual physical.     Form completed for home care - continue at adult home with caregiver during day time hours     Form completed for clearance at [facility] this summer     Environmental allergies  Good results with azelastine first for congestion secondary to seasonal allergies - may use flonase if no improvement of congestion   Continue with home filter  Monteleukast daily during allergy season  - montelukast (SINGULAIR) 10 MG tablet  Dispense: 90 tablet; Refill: 3  - azelastine (ASTELIN) 0.1 % nasal spray  Dispense: 30 mL; Refill: 0     Allergic eye reaction  Continue PRN use - refilling  - olopatadine (PATANOL) 0.1 % ophthalmic solution  Dispense: 5 mL; Refill: 0     Active autistic disorder  On Risperidone 1mg BID- and TID PRN- doing well with this -   Reviewed previous check A1c and Lipids given risk of metabolic disease last year - no need to repeat this year  - risperiDONE (RISPERDAL) 1 MG tablet  Dispense: 270 tablet; Refill: 3            BMI  Estimated body mass index is 25.69 kg/m  as calculated from the following:    Height as of this encounter: 1.795 m (5' 10.67\").    Weight as of this encounter: 82.8 kg (182 lb 8 oz).   Weight management plan: Discussed healthy diet and exercise guidelines    Counseling  Appropriate preventive services were addressed with this patient via screening, questionnaire, or discussion as appropriate for fall prevention, nutrition, physical activity, Tobacco-use cessation, social engagement, weight loss and cognition.  Checklist reviewing " preventive services available has been given to the patient.  Reviewed patient's diet, addressing concerns and/or questions.           Elizabeth Mckeon is a 29 year old, presenting for the following:  Physical (paperwork)        4/30/2025    12:51 PM   Additional Questions   Roomed by neymar gottlieb   Accompanied by MOM         4/30/2025   Forms   Any forms needing to be completed Yes          HPI  Calvin/Benny are caregivers - goes part time with a group home and part at home with parents - [employer] is staffing agency.  Lives in his own house and 24 hours staffing and two roommates with ASD - no nighttime concerns or sleep concerns.=.      Goes to [employer] [employer] [facility] day program - outings and cooking, did have a job pre COVID. Previously worked [employer]       [facility] - form to be completed today     Good eater - eating healthy foods, goes out for walks, social activities, special olympics (bowling, bocce ball)     No sensory integration concerns.      Exercises, likes bikes and walks, swims at camp - very physically and socially active     risperidone 1mg in Am and 1mg in PM and can have 1 tab at 3pm -  Patient does not have any anxiety at this time, no agitation  Doing well with this and medical marijuana 4 tablets a day  Indigo - 4 tabs/day -mom feels like both of these medications have made a huge difference in his mood and activity       Mom mentions history of astigmatism and possibly needs glasses for reading.  She says she will take him to [employer] to get his eyes rechecked.  He does not comment that this is a problem for him at this time.  Have been told previously to wear glasses at all times per mom.           Advance Care Planning    Discussed advance care planning with patient; informed AVS has link to [employer].        4/30/2025   General Health   How would you rate your overall physical health? Excellent   Feel stress (tense, anxious, or unable to sleep) Only a little   (!) STRESS CONCERN       4/30/2025   Nutrition   Three or more servings of calcium each day? Yes   Diet: Gluten-free/reduced   How many servings of fruit and vegetables per day? (!) 2-3   How many sweetened beverages each day? 0-1         4/30/2025   Exercise   Days per week of moderate/strenous exercise 5 days         4/30/2025   Social Factors   Frequency of gathering with friends or relatives More than three times a week   Worry food won't last until get money to buy more No   Food not last or not have enough money for food? No   Do you have housing? (Housing is defined as stable permanent housing and does not include staying outside in a car, in a tent, in an abandoned building, in an overnight shelter, or couch-surfing.) Yes   Are you worried about losing your housing? No   Lack of transportation? No   Unable to get utilities (heat,electricity)? No         4/30/2025   Dental   Dentist two times every year? Yes         Today's PHQ-2 Score:       4/30/2025    12:37 PM   PHQ-2 ( 1999 Pfizer)   Q1: Little interest or pleasure in doing things 0   Q2: Feeling down, depressed or hopeless 0   PHQ-2 Score 0    Q1: Little interest or pleasure in doing things Not at all   Q2: Feeling down, depressed or hopeless Not at all   PHQ-2 Score 0       Patient-reported           4/30/2025   Substance Use   Alcohol more than 3/day or more than 7/wk Not Applicable   Do you use any other substances recreationally? (!) CANNABIS PRODUCTS     Social History     Tobacco Use    Smoking status: Never     Passive exposure: Never    Smokeless tobacco: Never   Vaping Use    Vaping status: Never Used   Substance Use Topics    Alcohol use: No    Drug use: No             4/30/2025   One time HIV Screening   Previous HIV test? No         4/30/2025   STI Screening   New sexual partner(s) since last STI/HIV test? (!) DECLINE         4/30/2025   Contraception/Family Planning   Questions about contraception or family planning No        Reviewed and updated as needed this  "visit by Provider                          Review of Systems  Constitutional, HEENT, cardiovascular, pulmonary, gi and gu systems are negative, except as otherwise noted.     Objective    Exam  BP (!) 160/70 (BP Location: Right arm, Patient Position: Sitting, Cuff Size: Adult Regular)   Pulse 90   Temp 98.6  F (37  C) (Oral)   Resp 20   Ht 1.795 m (5' 10.67\")   Wt 82.8 kg (182 lb 8 oz)   SpO2 97%   BMI 25.69 kg/m     Estimated body mass index is 25.69 kg/m  as calculated from the following:    Height as of this encounter: 1.795 m (5' 10.67\").    Weight as of this encounter: 82.8 kg (182 lb 8 oz).    Physical Exam  GENERAL: alert and no distress, responds to direct communication from mom and provider, follows one step instruction  EYES: Eyes grossly normal to inspection, PERRL and conjunctivae and sclerae normal  HENT: ear canals and TM's normal, nose and mouth without ulcers or lesions  NECK: no adenopathy, no asymmetry, masses, or scars  RESP: lungs clear to auscultation - no rales, rhonchi or wheezes  CV: regular rate and rhythm, normal S1 S2, no S3 or S4, no murmur, click or rub, no peripheral edema  ABDOMEN: soft, nontender, no hepatosplenomegaly, no masses and bowel sounds normal  MS: no gross musculoskeletal defects noted, no edema  SKIN: no suspicious lesions or rashes  NEURO: Normal strength and tone, mentation intact and speech normal  PSYCH: mentation appears normal, affect normal/bright        Signed Electronically by: JADEN Adame CNP    "

## 2025-04-30 NOTE — PATIENT INSTRUCTIONS
Patient Education   Preventive Care Advice   This is general advice given by our system to help you stay healthy. However, your care team may have specific advice just for you. Please talk to your care team about your preventive care needs.  Nutrition  Eat 5 or more servings of fruits and vegetables each day.  Try wheat bread, brown rice and whole grain pasta (instead of white bread, rice, and pasta).  Get enough calcium and vitamin D. Check the label on foods and aim for 100% of the RDA (recommended daily allowance).  Lifestyle  Exercise at least 150 minutes each week  (30 minutes a day, 5 days a week).  Do muscle strengthening activities 2 days a week. These help control your weight and prevent disease.  No smoking.  Wear sunscreen to prevent skin cancer.  Have a dental exam and cleaning every 6 months.  Yearly exams  See your health care team every year to talk about:  Any changes in your health.  Any medicines your care team has prescribed.  Preventive care, family planning, and ways to prevent chronic diseases.  Shots (vaccines)   HPV shots (up to age 26), if you've never had them before.  Hepatitis B shots (up to age 59), if you've never had them before.  COVID-19 shot: Get this shot when it's due.  Flu shot: Get a flu shot every year.  Tetanus shot: Get a tetanus shot every 10 years.  Pneumococcal, hepatitis A, and RSV shots: Ask your care team if you need these based on your risk.  Shingles shot (for age 50 and up)  General health tests  Diabetes screening:  Starting at age 35, Get screened for diabetes at least every 3 years.  If you are younger than age 35, ask your care team if you should be screened for diabetes.  Cholesterol test: At age 39, start having a cholesterol test every 5 years, or more often if advised.  Bone density scan (DEXA): At age 50, ask your care team if you should have this scan for osteoporosis (brittle bones).  Hepatitis C: Get tested at least once in your life.  STIs (sexually  transmitted infections)  Before age 24: Ask your care team if you should be screened for STIs.  After age 24: Get screened for STIs if you're at risk. You are at risk for STIs (including HIV) if:  You are sexually active with more than one person.  You don't use condoms every time.  You or a partner was diagnosed with a sexually transmitted infection.  If you are at risk for HIV, ask about PrEP medicine to prevent HIV.  Get tested for HIV at least once in your life, whether you are at risk for HIV or not.  Cancer screening tests  Cervical cancer screening: If you have a cervix, begin getting regular cervical cancer screening tests starting at age 21.  Breast cancer scan (mammogram): If you've ever had breasts, begin having regular mammograms starting at age 40. This is a scan to check for breast cancer.  Colon cancer screening: It is important to start screening for colon cancer at age 45.  Have a colonoscopy test every 10 years (or more often if you're at risk) Or, ask your provider about stool tests like a FIT test every year or Cologuard test every 3 years.  To learn more about your testing options, visit:   .  For help making a decision, visit:   https://bit.ly/ze33474.  Prostate cancer screening test: If you have a prostate, ask your care team if a prostate cancer screening test (PSA) at age 55 is right for you.  Lung cancer screening: If you are a current or former smoker ages 50 to 80, ask your care team if ongoing lung cancer screenings are right for you.  For informational purposes only. Not to replace the advice of your health care provider. Copyright   2023 [facility]. All rights reserved. Clinically reviewed by the [facility]itions Program. "[employer] 031633 - REV 01/24.  Substance Use Disorder: Care Instructions  Overview     You can improve your life and health by stopping your use of alcohol or drugs. When you don't drink or use drugs, you may feel and sleep better. You may  get along better with your family, friends, and coworkers. There are medicines and programs that can help with substance use disorder.  How can you care for yourself at home?  Here are some ways to help you stay sober and prevent relapse.  If you have been given medicine to help keep you sober or reduce your cravings, be sure to take it exactly as prescribed.  Talk to your doctor about programs that can help you stop using drugs or drinking alcohol.  Do not keep alcohol or drugs in your home.  Plan ahead. Think about what you'll say if other people ask you to drink or use drugs. Try not to spend time with people who drink or use drugs.  Use the time and money spent on drinking or drugs to do something that's important to you.  Preventing a relapse  Have a plan to deal with relapse. Learn to recognize changes in your thinking that lead you to drink or use drugs. Get help before you start to drink or use drugs again.  Try to stay away from situations, friends, or places that may lead you to drink or use drugs.  If you feel the need to drink alcohol or use drugs again, seek help right away. Call a trusted friend or family member. Some people get support from organizations such as Narcotics Anonymous or [employer] or from treatment facilities.  If you relapse, get help as soon as you can. Some people make a plan with another person that outlines what they want that person to do for them if they relapse. The plan usually includes how to handle the relapse and who to notify in case of relapse.  Don't give up. Remember that a relapse doesn't mean that you have failed. Use the experience to learn the triggers that lead you to drink or use drugs. Then quit again. Recovery is a lifelong process. Many people have several relapses before they are able to quit for good.  Follow-up care is a key part of your treatment and safety. Be sure to make and go to all appointments, and call your doctor if you are having problems. It's  "also a good idea to know your test results and keep a list of the medicines you take.  When should you call for help?   Call 911  anytime you think you may need emergency care. For example, call if you or someone else:    Has overdosed or has withdrawal signs. Be sure to tell the emergency workers that you are or someone else is using or trying to quit using drugs. Overdose or withdrawal signs may include:  Losing consciousness.  Seizure.  Seeing or hearing things that aren't there (hallucinations).     Is thinking or talking about suicide or harming others.   Where to get help 24 hours a day, 7 days a week   If you or someone you know talks about suicide, self-harm, a mental health crisis, a substance use crisis, or any other kind of emotional distress, get help right away. You can:    Call the Suicide and Crisis Lifeline at 988.     Call 5-492-772-TALK (1-657.224.5233).     Text HOME to 411334 to access the Crisis Text Line.   Consider saving these numbers in your phone.  Go to [employer] for more information or to chat online.  Call your doctor now or seek immediate medical care if:    You are having withdrawal symptoms. These may include nausea or vomiting, sweating, shakiness, and anxiety.   Watch closely for changes in your health, and be sure to contact your doctor if:    You have a relapse.     You need more help or support to stop.   Where can you learn more?  Go to https://www.[employer].net/patiented  Enter H573 in the search box to learn more about \"Substance Use Disorder: Care Instructions.\"  Current as of: August 20, 2024  Content Version: 14.4    5632-1228 [employer]   Care instructions adapted under license by your healthcare professional. If you have questions about a medical condition or this instruction, always ask your healthcare professional. [employer] disclaims any warranty or liability for your use of this information.       "

## 2025-07-19 DIAGNOSIS — Z91.09 ENVIRONMENTAL ALLERGIES: ICD-10-CM

## 2025-07-21 RX ORDER — MONTELUKAST SODIUM 10 MG/1
1 TABLET ORAL AT BEDTIME
Qty: 90 TABLET | Refills: 3 | OUTPATIENT
Start: 2025-07-21